# Patient Record
Sex: MALE | Race: WHITE | NOT HISPANIC OR LATINO | URBAN - METROPOLITAN AREA
[De-identification: names, ages, dates, MRNs, and addresses within clinical notes are randomized per-mention and may not be internally consistent; named-entity substitution may affect disease eponyms.]

---

## 2017-05-30 DIAGNOSIS — Z00.00 WELL ADULT EXAM: ICD-10-CM

## 2017-05-31 ENCOUNTER — HOSPITAL ENCOUNTER (OUTPATIENT)
Facility: MEDICAL CENTER | Age: 72
End: 2017-05-31
Attending: FAMILY MEDICINE
Payer: COMMERCIAL

## 2017-05-31 ENCOUNTER — APPOINTMENT (OUTPATIENT)
Dept: OTHER | Facility: MEDICAL CENTER | Age: 72
End: 2017-05-31

## 2017-05-31 DIAGNOSIS — Z00.00 WELL ADULT EXAM: ICD-10-CM

## 2017-05-31 LAB
25(OH)D3 SERPL-MCNC: 14 NG/ML (ref 30–100)
ALBUMIN SERPL BCP-MCNC: 4.6 G/DL (ref 3.2–4.9)
ALBUMIN/GLOB SERPL: 1.6 G/DL
ALP SERPL-CCNC: 76 U/L (ref 30–99)
ALT SERPL-CCNC: 14 U/L (ref 2–50)
ANION GAP SERPL CALC-SCNC: 6 MMOL/L (ref 0–11.9)
APPEARANCE UR: CLEAR
AST SERPL-CCNC: 20 U/L (ref 12–45)
BASOPHILS # BLD AUTO: 0.2 % (ref 0–1.8)
BASOPHILS # BLD: 0.02 K/UL (ref 0–0.12)
BILIRUB SERPL-MCNC: 1.8 MG/DL (ref 0.1–1.5)
BILIRUB UR QL STRIP.AUTO: NEGATIVE
BUN SERPL-MCNC: 19 MG/DL (ref 8–22)
CALCIUM SERPL-MCNC: 9.2 MG/DL (ref 8.5–10.5)
CHLORIDE SERPL-SCNC: 105 MMOL/L (ref 96–112)
CO2 SERPL-SCNC: 27 MMOL/L (ref 20–33)
COLOR UR: YELLOW
CREAT SERPL-MCNC: 1.34 MG/DL (ref 0.5–1.4)
CREAT UR-MCNC: 120.4 MG/DL
CRP SERPL HS-MCNC: 1.7 MG/L (ref 0–7.5)
EOSINOPHIL # BLD AUTO: 0.08 K/UL (ref 0–0.51)
EOSINOPHIL NFR BLD: 0.9 % (ref 0–6.9)
ERYTHROCYTE [DISTWIDTH] IN BLOOD BY AUTOMATED COUNT: 42.8 FL (ref 35.9–50)
EST. AVERAGE GLUCOSE BLD GHB EST-MCNC: 108 MG/DL
GFR SERPL CREATININE-BSD FRML MDRD: 53 ML/MIN/1.73 M 2
GLOBULIN SER CALC-MCNC: 2.9 G/DL (ref 1.9–3.5)
GLUCOSE SERPL-MCNC: 103 MG/DL (ref 65–99)
GLUCOSE UR STRIP.AUTO-MCNC: NEGATIVE MG/DL
HBA1C MFR BLD: 5.4 % (ref 0–5.6)
HCT VFR BLD AUTO: 49 % (ref 42–52)
HGB BLD-MCNC: 16.5 G/DL (ref 14–18)
IMM GRANULOCYTES # BLD AUTO: 0.03 K/UL (ref 0–0.11)
IMM GRANULOCYTES NFR BLD AUTO: 0.4 % (ref 0–0.9)
KETONES UR STRIP.AUTO-MCNC: NEGATIVE MG/DL
LEUKOCYTE ESTERASE UR QL STRIP.AUTO: NEGATIVE
LYMPHOCYTES # BLD AUTO: 1.33 K/UL (ref 1–4.8)
LYMPHOCYTES NFR BLD: 15.6 % (ref 22–41)
MCH RBC QN AUTO: 31.1 PG (ref 27–33)
MCHC RBC AUTO-ENTMCNC: 33.7 G/DL (ref 33.7–35.3)
MCV RBC AUTO: 92.3 FL (ref 81.4–97.8)
MICRO URNS: NORMAL
MICROALBUMIN UR-MCNC: 2 MG/DL
MICROALBUMIN/CREAT UR: 17 MG/G (ref 0–30)
MONOCYTES # BLD AUTO: 0.44 K/UL (ref 0–0.85)
MONOCYTES NFR BLD AUTO: 5.2 % (ref 0–13.4)
NEUTROPHILS # BLD AUTO: 6.63 K/UL (ref 1.82–7.42)
NEUTROPHILS NFR BLD: 77.7 % (ref 44–72)
NITRITE UR QL STRIP.AUTO: NEGATIVE
NRBC # BLD AUTO: 0 K/UL
NRBC BLD AUTO-RTO: 0 /100 WBC
PH UR STRIP.AUTO: 6.5 [PH]
PLATELET # BLD AUTO: 186 K/UL (ref 164–446)
PMV BLD AUTO: 10.5 FL (ref 9–12.9)
POTASSIUM SERPL-SCNC: 4.5 MMOL/L (ref 3.6–5.5)
PROT SERPL-MCNC: 7.5 G/DL (ref 6–8.2)
PROT UR QL STRIP: NEGATIVE MG/DL
PSA SERPL-MCNC: 2.53 NG/ML (ref 0–4)
RBC # BLD AUTO: 5.31 M/UL (ref 4.7–6.1)
RBC UR QL AUTO: NEGATIVE
SODIUM SERPL-SCNC: 138 MMOL/L (ref 135–145)
SP GR UR STRIP.AUTO: 1.01
T4 FREE SERPL-MCNC: 0.97 NG/DL (ref 0.53–1.43)
TSH SERPL DL<=0.005 MIU/L-ACNC: 0.77 UIU/ML (ref 0.3–3.7)
WBC # BLD AUTO: 8.5 K/UL (ref 4.8–10.8)

## 2017-06-02 LAB
LPA SERPL-MCNC: 8 MG/DL
SHBG SERPL-SCNC: 57 NMOL/L (ref 11–80)
TESTOST FREE MFR SERPL: 1.4 % (ref 1.6–2.9)
TESTOST FREE SERPL-MCNC: 79 PG/ML (ref 47–244)
TESTOST SERPL-MCNC: 577 NG/DL (ref 300–720)

## 2017-06-04 LAB
CHOLEST SERPL-MCNC: 185 MG/DL (ref 100–199)
HDL PARTICAL NO Q4363: 32.3 UMOL/L
HDL SERPL QN: 10.1 NM
HDLC SERPL-MCNC: 75 MG/DL
HLD.LARGE SERPL-SCNC: 12.3 UMOL/L
LDL MED SERPL QN: 21.5 NM
LDL SERPL QN: 21.5 NM
LDL SERPL-SCNC: 958 NMOL/L
LDL SMALL SERPL-SCNC: <90 NMOL/L
LDL SMALL SERPL-SCNC: <90 NMOL/L
LDLC SERPL CALC-MCNC: 92 MG/DL (ref 0–99)
LP IR SCORE Q4364: <25
TRIGL SERPL-MCNC: 89 MG/DL (ref 0–149)
VLDL LARGE SERPL-SCNC: 2 NMOL/L
VLDL SERPL QN: 45.6 NM

## 2017-06-06 ENCOUNTER — HOSPITAL ENCOUNTER (OUTPATIENT)
Facility: MEDICAL CENTER | Age: 72
End: 2017-06-06
Attending: FAMILY MEDICINE
Payer: COMMERCIAL

## 2017-06-06 PROCEDURE — 82274 ASSAY TEST FOR BLOOD FECAL: CPT

## 2017-06-09 ENCOUNTER — HOSPITAL ENCOUNTER (OUTPATIENT)
Facility: MEDICAL CENTER | Age: 72
End: 2017-06-09
Attending: FAMILY MEDICINE

## 2017-06-09 LAB — HEMOCCULT STL QL IA: NEGATIVE

## 2017-06-15 ENCOUNTER — HOSPITAL ENCOUNTER (OUTPATIENT)
Dept: RADIOLOGY | Facility: MEDICAL CENTER | Age: 72
End: 2017-06-15
Attending: FAMILY MEDICINE
Payer: COMMERCIAL

## 2017-06-15 ENCOUNTER — OFFICE VISIT (OUTPATIENT)
Dept: OTHER | Facility: MEDICAL CENTER | Age: 72
End: 2017-06-15

## 2017-06-15 VITALS
DIASTOLIC BLOOD PRESSURE: 90 MMHG | OXYGEN SATURATION: 96 % | BODY MASS INDEX: 27.04 KG/M2 | HEIGHT: 73 IN | WEIGHT: 204 LBS | HEART RATE: 72 BPM | TEMPERATURE: 97.5 F | RESPIRATION RATE: 14 BRPM | SYSTOLIC BLOOD PRESSURE: 140 MMHG

## 2017-06-15 DIAGNOSIS — Z28.39 BEHIND ON IMMUNIZATIONS: ICD-10-CM

## 2017-06-15 DIAGNOSIS — Z82.49 FAMILY HISTORY OF ISCHEMIC HEART DISEASE (IHD): ICD-10-CM

## 2017-06-15 DIAGNOSIS — K80.20 CALCULUS OF GALLBLADDER WITHOUT CHOLECYSTITIS WITHOUT OBSTRUCTION: ICD-10-CM

## 2017-06-15 DIAGNOSIS — I65.23 CAROTID ATHEROSCLEROSIS, BILATERAL: ICD-10-CM

## 2017-06-15 DIAGNOSIS — Z00.00 PHYSICAL EXAM, ROUTINE: ICD-10-CM

## 2017-06-15 DIAGNOSIS — I25.84 CORONARY ATHEROSCLEROSIS DUE TO CALCIFIED CORONARY LESION OF NATIVE ARTERY: ICD-10-CM

## 2017-06-15 DIAGNOSIS — E04.9 GOITER: ICD-10-CM

## 2017-06-15 DIAGNOSIS — R17 TOTAL BILIRUBIN, ELEVATED: ICD-10-CM

## 2017-06-15 DIAGNOSIS — N40.1 BENIGN NON-NODULAR PROSTATIC HYPERPLASIA WITH LOWER URINARY TRACT SYMPTOMS: ICD-10-CM

## 2017-06-15 DIAGNOSIS — I48.20 CHRONIC ATRIAL FIBRILLATION (HCC): ICD-10-CM

## 2017-06-15 DIAGNOSIS — I70.0 THORACIC AORTA ATHEROSCLEROSIS (HCC): ICD-10-CM

## 2017-06-15 DIAGNOSIS — Z80.0 FAMILY HISTORY OF PANCREATIC CANCER: ICD-10-CM

## 2017-06-15 DIAGNOSIS — E88.819 INSULIN RESISTANCE: ICD-10-CM

## 2017-06-15 DIAGNOSIS — E55.9 VITAMIN D DEFICIENCY: ICD-10-CM

## 2017-06-15 DIAGNOSIS — R73.01 ELEVATED FASTING GLUCOSE: ICD-10-CM

## 2017-06-15 DIAGNOSIS — Z82.49 FAMILY HISTORY OF HYPERTENSION IN MOTHER: ICD-10-CM

## 2017-06-15 DIAGNOSIS — R93.1 AGATSTON CAC SCORE, >400: ICD-10-CM

## 2017-06-15 DIAGNOSIS — Z00.00 WELL ADULT EXAM: Primary | ICD-10-CM

## 2017-06-15 DIAGNOSIS — I10 ELEVATED BLOOD PRESSURE READING IN OFFICE WITH DIAGNOSIS OF HYPERTENSION: ICD-10-CM

## 2017-06-15 DIAGNOSIS — I25.10 CORONARY ATHEROSCLEROSIS DUE TO CALCIFIED CORONARY LESION OF NATIVE ARTERY: ICD-10-CM

## 2017-06-15 PROCEDURE — 306734 HCHG ADVANCED VASCULAR SCREENING

## 2017-06-15 PROCEDURE — 76700 US EXAM ABDOM COMPLETE: CPT

## 2017-06-15 PROCEDURE — 4410556 CT-CARDIAC SCORING

## 2017-06-15 PROCEDURE — 71020 DX-CHEST-2 VIEWS: CPT

## 2017-06-15 NOTE — PROGRESS NOTES
[facility]    EXECUTIVE HISTORY AND PHYSICAL  Performed by Dr. Shine Burch    SUBJECTIVE:    Chief Complaint   Patient presents with   • Executive Physical     New patient to [facility]   • Atrial Fibrillation     Chronic, untreated   • Other     He does not currently have a PCP   • Hyperglycemia   • Abnormal Labs     Elevated Total bilirubin   • Vitamin D Deficiency   • Coronary Artery Disease     Per signficantly elevated CT-Cardiac Score   • Other     Carotid and Aortic atherosclerosis   • Cholelithiasis     Asymptomatic   • Benign Prostatic Hypertrophy   • Other     Family hx of IHD, HTN, Panreatic Cancer       Sharan Noe is a 72 y.o. male,   New Patient @ [facility]    Preventive medicine issues discussed:  abuse, aspirin, dental, Alcohol, Tobacco, HIV/AIDS, injuries, mental health/depression, nutrition, exercise, occupational health, sexual behavior, UV exposure, Cancer Screening. Vaccines.      PROBLEM #1-HISTORY OF PRESENT ILLNESS  Existing Problem  PATIENT STATEMENT OF PROBLEM - Chronic, untreated A.fib  ONSET - 30+ years  COURSE - A.fib again seen on ECG. Exercise Stress Test otherwise normal/negative.  Patient was on Warfarin decades ago, but he discontinued on his own after a short period and has never resumed anticoagulation therapy.  Denies Cerebrovascular Disease history or symptoms.   INTENSITY/STATUS/LOCATION/RADIATION - present  AGGRAVATORS - ?  RELIEVERS - none  TREATMENTS/COMPLIANCE/@GOAL? - none/ no/ no     PROBLEM #2-HISTORY OF PRESENT ILLNESS  New Problem  PATIENT STATEMENT OF PROBLEM - Subclinical significant Atherosclerosis  ONSET - identified today  COURSE - No CVD/CeVD event history.  Family Hx of Ischemic Heart Disease and HTN.  CT-Cardiac Score today is 1974.3.  He is also found to have Carotid atherosclerosis and Thoracic Aorta atherosclerosis per Imaging. Current pertinent labs:   HIGH & INTERMEDIATE RISK:  FBS(103)/Microalbumin Creatinine Ratio/D(14).    His NMR lipoprotein profile is normal. Total Bilirubin elevated at 1.8.  INTENSITY/STATUS/LOCATION/RADIATION - severe/ subclinical/ as above  AGGRAVATORS - Multifactorial. Blood pressure is elevated today. He is found to have Cholelithiasis.   RELIEVERS - some Therapeutic Lifestyle Changes   TREATMENTS/COMPLIANCE/@GOAL? - same/ no/ no     PROBLEM #3-HISTORY OF PRESENT ILLNESS  New Problem  PATIENT STATEMENT OF PROBLEM - Asymptomatic Cholelithiasis  ONSET - identified today via U/S  COURSE - Asymptomatic. Counseled.     PROBLEM #4-HISTORY OF PRESENT ILLNESS  Likely Existing Problem  PATIENT STATEMENT OF PROBLEM - BPH per U/S and LORE  ONSET - discussed today  COURSE - counseled. PSA normal for age.     PROBLEM #5-HISTORY OF PRESENT ILLNESS  Existing Problem  PATIENT STATEMENT OF PROBLEM - Family history of Pancreatic Cancer  ONSET - years  COURSE - per U/S: The visualized pancreas is unremarkable    PROBLEM #6-HISTORY OF PRESENT ILLNESS  New Problem  PATIENT STATEMENT OF PROBLEM - He is overdue for Colonoscopy and Prevnar.   ONSET - discussed today    Diagnosing METABOLIC SYNDROME  -?-Must have 3 or more of the following 5 Risk Factors(Patient meets criteria # 4,5)     RISK FACTOR    DEFINING LEVEL  1-Abdominal Obesity        Waist [email or web address]   Men (North Americans)  >102 cm (>40 inches)   Women (North Americans)  >88 cm (>35 inches)  (see literature for Ethnic Group waist circumference differences)  2-Triglycerides ?150 mg/dL (or on treatment for this lipid disorder)  3-HDL Cholesterol    Men  <40 mg/dL (or on treatment for this lipid disorder)   Women <50 mg/dL (or on treatment for this lipid disorder)  4-Blood Pressure  ?130 systolic or ?84 diastolic (or on HTN treatment)  5-Fasting Glucose ?100 mg/dL(or previously diagnosed DM or Ins. Resistance)  (FYI: If FBS ?100 mg/dL, then patient also has Insulin  Resistance)    Synonyms  Hypertension-hyperglycemia-hyperuricemia syndrome   Syndrome X   Dysmetabolic syndrome X   Insulin resistance syndrome   Metabolic dyslipidemia   The deadly quartet (upper-body obesity, glucose intolerance, hypertriglyceridemia, and hypertension)   Civilization syndrome  Reaven Syndrome    Diagnosing INSULIN RESISTANCE: Any 1 of following (Patient meets criteria # 3)  1. Presence of METABOLIC SYNDROME  2. TRIGLYCERIDE/HDL RATIO:  - >3.5 = IR in Caucasians  - ?3.0 = IR in /E[place]re[place] Americans  - ?2.0 = IR in Non- Blacks  3. Fasting Blood Sugar ? 100 mg/dL         (If FBS > 126, then DM2)  4. Oral Glucose Tolerance Test   - One hour glucose: ? 125 mg/dL   - (If > 150, significantly increased risk of developing DM2)  - Two hour glucose: ? 120 mg/dL  - (120-139=only 33% B-cell fx. 140-199=only 15% B-cell fx)   - (200 or above=DM2 and only 10% B-cell fx.)  - PRE-DIABETES, a type of Insulin Resistance:  o Two hour glucose of 140 to 199  5. A1c ? 6.5%                     (or ? 5.7 % AND the following 2 Dental Parameters:    1- ? 26% of Gum Pockets are ?5mm depth    2- ? 4 Missing Teeth)     No Known Allergies    Patient Active Problem List    Diagnosis Date Noted   • Atrial fibrillation (CMS-HCC) 12/30/2011     Priority: High   • Executive History and Physical examination 06/22/2017   • Chronic atrial fibrillation (CMS-HCC) 06/22/2017   • Elevated blood pressure reading in office with diagnosis of hypertension 06/22/2017   • Agatston CAC score, >400 06/22/2017   • Elevated fasting glucose 06/22/2017   • Insulin resistance 06/22/2017   • Total bilirubin, elevated 06/22/2017   • Vitamin D deficiency 06/22/2017   • Coronary atherosclerosis due to calcified coronary lesions of native arteries, severe, asymptomatic 06/22/2017   • Carotid atherosclerosis 06/22/2017   • Thoracic aorta atherosclerosis (CMS-HCC) 06/22/2017   • Calculus of gallbladder without cholecystitis without obstruction  "06/22/2017   • Benign non-nodular prostatic hyperplasia with lower urinary tract symptoms 06/22/2017   • Behind on immunizations & screening Colonoscopy 06/22/2017   • A-fib (CMS-[facility])    • Goiter    • Family history of ischemic heart disease (IHD)    • Family history of hypertension in mother    • Family history of pancreatic cancer        Current Outpatient Prescriptions on File Prior to Visit   Medication Sig Dispense Refill   • vardenafil (LEVITRA) 10 MG tablet Take 10 mg by mouth as needed.       No current facility-administered medications on file prior to visit.       Social History     Social History   • Marital Status:      Spouse Name: Cynthia   • Number of Children: 2   • Years of Education: 20     Occupational History   •       Social History Main Topics   • Smoking status: Never Smoker    • Smokeless tobacco: Never Used   • Alcohol Use: 0.0 oz/week     0 Standard drinks or equivalent per week      Comment: rarely   • Drug Use: No   • Sexual Activity:     Partners: Female      Comment: has used levitra in past     Other Topics Concern   • Not on file     Social History Narrative       Family History   Problem Relation Age of Onset   • Hypertension Mother    • Heart Attack Father 89   • Dementia Mother 80   • Cancer Sister 68     pancreatic   • Other Sister      Brain aneurysm       Patient's Past, Social, and Family History reviewed     REVIEW OF SYMPTOMS:               Pertinent Positives as above.    All other systems reviewed and negative.     OBJECTIVE:    /90 mmHg  Pulse 72  Temp(Src) 36.4 °C (97.5 °F)  Resp 14  Ht 1.842 m (6' 0.5\")  Wt 92.534 kg (204 lb)  BMI 27.27 kg/m2  SpO2 96%  Body mass index is 27.27 kg/(m^2).    Well developed, well nourished male, no acute distress, non-ill appearing. Comfortable, appears stated age, pleasant and cooperative, Alert and Oriented x 3.   HEAD: atraumatic, normocephalic   EYES: Conjunctiva normal, EOMI, PERRLA, acuity grossly intact. "   EARS/NOSE/THROAT: TM's normal, no SSX of infection, no perforation, no hemotympanum, acuity grossly intact. Oropharynx: benign, no lesions noted. Nares: benign.   NECK: supple, no adenopathy, mild thyromegaly without nodules, no JVD, no carotid bruits.   CHEST/LUNGS: clear to auscultation and percussion bilaterally. No adventitious breath sounds.   CARDIOVASCULAR: regular rate and rhythm, no murmur. PMI not displaced. Good central and peripheral pulses.   BACK: no CVA tenderness.   ABDOMEN: soft, non-tender, non-distended, no masses, no hepatosplenomegaly. Normal active bowel tones.   : deferred.   Rectal: prostate moderately enlarged, no palpable other abnormalities.    Extremities: warm/well-perfused, no cyanosis, clubbing, or edema.   SKIN: clear, unbroken, no rashes, normal turgor.   Neuro: Mental Status: Alert and Oriented x 3. CN II-XII grossly intact. Gait normal. Non-focal, intact. Normal strength, sensation    EXERCISE STRESS TEST REPORT:    Interpreted by me    INTERPRETATION:  Atrial fibrillation on ECG. Patient achieved 100% of maximum predicted heart rate with physiological response in blood pressure and no associated ST segment depression.   Also, specifically no associated ST elevation, no significant ventricular extrasystoles, no ventricular tachycardia,or supraventricular tachycardia, and no new heart blocks.  Patient denied chest pain, severe dyspnea, dizziness, or ataxia.     CONCLUSION:  Atrial Fibrillation. Negative Exercise Stress Test indicating low probability of flow-limiting coronary artery disease. Poor exercise tolerance.     ASSESSMENT:    Encounter Diagnoses   Name Primary?   • Executive History and Physical examination Yes   • Chronic atrial fibrillation (CMS-HCC)    • Elevated blood pressure reading in office with diagnosis of hypertension    • Family history of ischemic heart disease (IHD)    • Family history of hypertension in mother    • Family history of pancreatic cancer    •  Agatston CAC score, >400    • Elevated fasting glucose    • Insulin resistance    • Total bilirubin, elevated    • Vitamin D deficiency    • Coronary atherosclerosis due to calcified coronary lesions of native arteries, severe, asymptomatic    • Carotid atherosclerosis, bilateral    • Thoracic aorta atherosclerosis (CMS-HCC)    • Calculus of gallbladder without cholecystitis without obstruction    • Benign non-nodular prostatic hyperplasia with lower urinary tract symptoms    • Behind on immunizations & screening Colonoscopy    • Goiter        PLAN:    Total Face-to-Face time spent with patient: 75 minutes  Amount of time spent counseling patient and/or coordinating care: 50 minutes    The nature of patient counseling as below:  -Patient Education  -Differential Diagnoses and treatment options discussed  -Risks, benefits, alternatives discussed  -Labs reviewed with patient in detail  -ETT/ECG/Imaging/PFT/vision/hearing reports reviewed with patient in detail  -Health Maintenance Exam issues discussed  -Exercise  -Dietary recommendations discussed  -Weight Loss strategies discussed  -Therapeutic Lifestyle Changes discussed    The nature of coordination of care as below:  -Medications added: Final decisions regarding medications to be made between patient and Primary Care Provider, and Cardiology.  Review of the chart reveals that previous providers have recommended anticoagulation therapy for his A.fib.  Patient was on Warfarin for a short time several decades ago, but discontinued this on his own and has never resumed.  I have referred him to Cardiology to address overall treatment for chronic untreated A.fib, and also newly diagnosed severe, subclinical atherosclerosis, primarily of the coronary arteries.  He realizes that it will likely again be recommended that he begin anticoagulation therapy, along with statin.  Blood pressure diary will help dictate decisions regarding anti-hypertension treatment.    -Medications discontinued: none  -Medications adjusted: none  -Referrals: Cardiology referral process initiated   -I strongly encouraged him to establish with and schedule appointment with Primary Care Provider ASAP to review results of today's examination and develop a plan moving forward.  -Other: He now knows he is overdue for repeat COLONOSCOPY   -He is due for Prevnar vaccine  -Seek medical attention immediately if worse    FOLLOW-UP:  -With Primary Care Provider soon, and with Cardiology soon (referral initiated, and patient has CARD phone number to call).

## 2017-06-15 NOTE — Clinical Note
"June 22, 2017        Sharan Noe  [address] [place] [address]        Dear Sharan Mccray:    Thank you for participating in [facility].  I enjoyed meeting you today and performing your Executive History and Physical examination.  We covered a great deal of information, and I have summarized my Assessment and Plan below.  Please carefully review all the information in this packet.  I do suggest you schedule an appointment with a Primary Care Provider soon to review the results of your examination and develop a plan moving forward.  As discussed, I also referred you to Dr. Niko Sawant, a local Cardiologist.  If you have not already done so, please schedule an appointment with Dr. Sawant.     We identified several issues today that warrant further attention.  You remain in Atrial Fibrillation, and it sounds like you have been aware of this diagnosis for 30+ years.  I do recommend you establish care with a local Cardiologist to make sure you are optimally treated for this condition, and to reduce your likelihood of stroke as a result of A.fib.  Your Blood Pressure was elevated today; please keep a blood pressure diary and present this diary (and your home BP machine) to your future healthcare providers.  You were found to have a significant amount of atherosclerotic plaque of your heart arteries, and a lesser amount of plaque in your neck and thoracic aorta arteries.  There was some evidence of arterial inflammation per labs.  This is another issue that needs to be addressed by your personal healthcare providers.  Please see my comments below regarding Statin Therapy, anticoagulation therapy, and anti-hypertension therapy.  As we discussed, you do have multiple Gallstones, and these may require future attention, particularly if they become symptomatic.  We discussed that you had tried \"Flomax\" for your enlarged prostate in the past.  If your Prostate symptoms worsen, you may be a " "candidate for a future trial of medications.  I do recommend you undergo another Screening Colonoscopy soon, and that you obtain a Prevnar vaccine soon.  Therapeutic Lifestyle Changes are very important for you, and I specifically recommend eating \"real food, mostly plants, not too much,\" a moderate amount of weight loss, daily exercise, actively managing stress, 7-8 hours of sleep per night, a loving social environment, and an altruistic philosophy.  If you have any questions or concerns, please don't hesitate to call.  Sincerely,        Shine Burch M.D.    ASSESSMENT:    Encounter Diagnoses   Name Primary?   • Executive History and Physical examination Yes   • Chronic atrial fibrillation (CMS-HCC)    • Elevated blood pressure reading in office with diagnosis of hypertension    • Family history of ischemic heart disease (IHD)    • Family history of hypertension in mother    • Family history of pancreatic cancer    • Agatston CAC score, >400    • Elevated fasting glucose    • Insulin resistance    • Total bilirubin, elevated    • Vitamin D deficiency    • Coronary atherosclerosis due to calcified coronary lesions of native arteries, severe, asymptomatic    • Carotid atherosclerosis, bilateral    • Thoracic aorta atherosclerosis (CMS-HCC)    • Calculus of gallbladder without cholecystitis without obstruction    • Benign non-nodular prostatic hyperplasia with lower urinary tract symptoms    • Behind on immunizations & screening Colonoscopy    • Goiter        PLAN:    Total Face-to-Face time spent with patient: 75 minutes  Amount of time spent counseling patient and/or coordinating care: 50 minutes    The nature of patient counseling as below:  -Patient Education  -Differential Diagnoses and treatment options discussed  -Risks, benefits, alternatives discussed  -Labs reviewed with patient in detail  -ETT/ECG/Imaging/PFT/vision/hearing reports reviewed with patient in detail  -Health Maintenance Exam issues " discussed  -Exercise  -Dietary recommendations discussed  -Weight Loss strategies discussed  -Therapeutic Lifestyle Changes discussed    The nature of coordination of care as below:  -Medications added: Final decisions regarding medications to be made between patient and Primary Care Provider, and Cardiology.  Review of the chart reveals that previous providers have recommended anticoagulation therapy for his A.fib.  Patient was on Warfarin for a short time several decades ago, but discontinued this on his own and has never resumed.  I have referred him to Cardiology to address overall treatment for chronic untreated A.fib, and also newly diagnosed severe, subclinical atherosclerosis, primarily of the coronary arteries.  He realizes that it will likely again be recommended that he begin anticoagulation therapy, along with statin.  Blood pressure diary will help dictate decisions regarding anti-hypertension treatment.   -Medications discontinued: none  -Medications adjusted: none  -Referrals: Cardiology referral process initiated   -I strongly encouraged him to establish with and schedule appointment with Primary Care Provider ASAP to review results of today's examination and develop a plan moving forward.  -Other: He now knows he is overdue for repeat COLONOSCOPY   -He is due for Prevnar vaccine  -Seek medical attention immediately if worse    FOLLOW-UP:  -With Primary Care Provider soon, and with Cardiology soon (referral initiated, and patient has CARD phone number to call).

## 2017-06-22 PROBLEM — N40.1 BENIGN NON-NODULAR PROSTATIC HYPERPLASIA WITH LOWER URINARY TRACT SYMPTOMS: Status: ACTIVE | Noted: 2017-06-22

## 2017-06-22 PROBLEM — R17 TOTAL BILIRUBIN, ELEVATED: Status: ACTIVE | Noted: 2017-06-22

## 2017-06-22 PROBLEM — K80.20 CALCULUS OF GALLBLADDER WITHOUT CHOLECYSTITIS WITHOUT OBSTRUCTION: Status: ACTIVE | Noted: 2017-06-22

## 2017-06-22 PROBLEM — E55.9 VITAMIN D DEFICIENCY: Status: ACTIVE | Noted: 2017-06-22

## 2017-06-22 PROBLEM — E88.819 INSULIN RESISTANCE: Status: ACTIVE | Noted: 2017-06-22

## 2017-06-22 PROBLEM — I65.29 CAROTID ATHEROSCLEROSIS: Status: ACTIVE | Noted: 2017-06-22

## 2017-06-22 PROBLEM — Z00.00 WELL ADULT EXAM: Status: ACTIVE | Noted: 2017-06-22

## 2017-06-22 PROBLEM — I10 ELEVATED BLOOD PRESSURE READING IN OFFICE WITH DIAGNOSIS OF HYPERTENSION: Status: ACTIVE | Noted: 2017-06-22

## 2017-06-22 PROBLEM — I25.10 CORONARY ATHEROSCLEROSIS DUE TO CALCIFIED CORONARY LESION OF NATIVE ARTERY: Status: ACTIVE | Noted: 2017-06-22

## 2017-06-22 PROBLEM — I48.20 CHRONIC ATRIAL FIBRILLATION (HCC): Status: ACTIVE | Noted: 2017-06-22

## 2017-06-22 PROBLEM — R73.01 ELEVATED FASTING GLUCOSE: Status: ACTIVE | Noted: 2017-06-22

## 2017-06-22 PROBLEM — I25.84 CORONARY ATHEROSCLEROSIS DUE TO CALCIFIED CORONARY LESION OF NATIVE ARTERY: Status: ACTIVE | Noted: 2017-06-22

## 2017-06-22 PROBLEM — I70.0 THORACIC AORTA ATHEROSCLEROSIS (HCC): Status: ACTIVE | Noted: 2017-06-22

## 2017-06-22 PROBLEM — Z28.39 BEHIND ON IMMUNIZATIONS: Status: ACTIVE | Noted: 2017-06-22

## 2017-06-22 PROBLEM — R93.1 AGATSTON CAC SCORE, >400: Status: ACTIVE | Noted: 2017-06-22

## 2017-06-22 NOTE — PATIENT INSTRUCTIONS
Current Outpatient Prescriptions Ordered in [facility]   Medication Sig Dispense Refill   • vardenafil (LEVITRA) 10 MG tablet Take 10 mg by mouth as needed.       No current Epic-ordered facility-administered medications on file.

## 2017-11-15 ENCOUNTER — APPOINTMENT (OUTPATIENT)
Dept: SOCIAL WORK | Facility: CLINIC | Age: 72
End: 2017-11-15

## 2017-11-15 PROCEDURE — 90662 IIV NO PRSV INCREASED AG IM: CPT | Performed by: REGISTERED NURSE

## 2019-03-22 DIAGNOSIS — Z01.810 PRE-OPERATIVE CARDIOVASCULAR EXAMINATION: ICD-10-CM

## 2019-03-22 DIAGNOSIS — Z01.812 PRE-OPERATIVE LABORATORY EXAMINATION: ICD-10-CM

## 2019-03-22 LAB
ERYTHROCYTE [DISTWIDTH] IN BLOOD BY AUTOMATED COUNT: 43.8 FL (ref 35.9–50)
HCT VFR BLD AUTO: 44.2 % (ref 42–52)
HGB BLD-MCNC: 15 G/DL (ref 14–18)
MCH RBC QN AUTO: 31.7 PG (ref 27–33)
MCHC RBC AUTO-ENTMCNC: 33.9 G/DL (ref 33.7–35.3)
MCV RBC AUTO: 93.4 FL (ref 81.4–97.8)
PLATELET # BLD AUTO: 177 K/UL (ref 164–446)
PMV BLD AUTO: 9.9 FL (ref 9–12.9)
RBC # BLD AUTO: 4.73 M/UL (ref 4.7–6.1)
WBC # BLD AUTO: 6.8 K/UL (ref 4.8–10.8)

## 2019-03-22 PROCEDURE — 36415 COLL VENOUS BLD VENIPUNCTURE: CPT

## 2019-03-22 PROCEDURE — 93005 ELECTROCARDIOGRAM TRACING: CPT

## 2019-03-22 PROCEDURE — 87086 URINE CULTURE/COLONY COUNT: CPT

## 2019-03-22 PROCEDURE — 81001 URINALYSIS AUTO W/SCOPE: CPT

## 2019-03-22 PROCEDURE — 85027 COMPLETE CBC AUTOMATED: CPT

## 2019-03-22 RX ORDER — ATORVASTATIN CALCIUM 10 MG/1
10 TABLET, FILM COATED ORAL DAILY
COMMUNITY
End: 2021-10-27 | Stop reason: SDUPTHER

## 2019-03-22 RX ORDER — CARVEDILOL 3.12 MG/1
3.12 TABLET ORAL 2 TIMES DAILY WITH MEALS
COMMUNITY

## 2019-03-23 LAB
APPEARANCE UR: CLEAR
BACTERIA #/AREA URNS HPF: NEGATIVE /HPF
BILIRUB UR QL STRIP.AUTO: NEGATIVE
COLOR UR: YELLOW
EKG IMPRESSION: NORMAL
EPI CELLS #/AREA URNS HPF: NEGATIVE /HPF
GLUCOSE UR STRIP.AUTO-MCNC: NEGATIVE MG/DL
HYALINE CASTS #/AREA URNS LPF: ABNORMAL /LPF
KETONES UR STRIP.AUTO-MCNC: NEGATIVE MG/DL
LEUKOCYTE ESTERASE UR QL STRIP.AUTO: NEGATIVE
MICRO URNS: ABNORMAL
NITRITE UR QL STRIP.AUTO: NEGATIVE
PH UR STRIP.AUTO: 5.5 [PH]
PROT UR QL STRIP: 30 MG/DL
RBC # URNS HPF: ABNORMAL /HPF
RBC UR QL AUTO: NEGATIVE
SP GR UR STRIP.AUTO: 1.02
UROBILINOGEN UR STRIP.AUTO-MCNC: 0.2 MG/DL
WBC #/AREA URNS HPF: ABNORMAL /HPF

## 2019-03-23 PROCEDURE — 93010 ELECTROCARDIOGRAM REPORT: CPT | Performed by: INTERNAL MEDICINE

## 2019-03-24 LAB
BACTERIA UR CULT: NORMAL
SIGNIFICANT IND 70042: NORMAL
SITE SITE: NORMAL
SOURCE SOURCE: NORMAL

## 2019-04-11 ENCOUNTER — ANESTHESIA EVENT (OUTPATIENT)
Dept: SURGERY | Facility: MEDICAL CENTER | Age: 74
End: 2019-04-11
Payer: MEDICARE

## 2019-04-11 ENCOUNTER — ANESTHESIA (OUTPATIENT)
Dept: SURGERY | Facility: MEDICAL CENTER | Age: 74
End: 2019-04-11
Payer: MEDICARE

## 2019-04-11 ENCOUNTER — HOSPITAL ENCOUNTER (OUTPATIENT)
Facility: MEDICAL CENTER | Age: 74
End: 2019-04-12
Attending: UROLOGY | Admitting: UROLOGY
Payer: MEDICARE

## 2019-04-11 LAB — PATHOLOGY CONSULT NOTE: NORMAL

## 2019-04-11 PROCEDURE — 160036 HCHG PACU - EA ADDL 30 MINS PHASE I: Performed by: UROLOGY

## 2019-04-11 PROCEDURE — 700101 HCHG RX REV CODE 250: Performed by: UROLOGY

## 2019-04-11 PROCEDURE — 700111 HCHG RX REV CODE 636 W/ 250 OVERRIDE (IP): Performed by: ANESTHESIOLOGY

## 2019-04-11 PROCEDURE — A4346 CATH INDW FOLEY 3 WAY: HCPCS | Performed by: UROLOGY

## 2019-04-11 PROCEDURE — 500879 HCHG PACK, CYSTO: Performed by: UROLOGY

## 2019-04-11 PROCEDURE — 88342 IMHCHEM/IMCYTCHM 1ST ANTB: CPT

## 2019-04-11 PROCEDURE — 501329 HCHG SET, CYSTO IRRIG Y TUR: Performed by: UROLOGY

## 2019-04-11 PROCEDURE — 160048 HCHG OR STATISTICAL LEVEL 1-5: Performed by: UROLOGY

## 2019-04-11 PROCEDURE — A4357 BEDSIDE DRAINAGE BAG: HCPCS | Performed by: UROLOGY

## 2019-04-11 PROCEDURE — 160035 HCHG PACU - 1ST 60 MINS PHASE I: Performed by: UROLOGY

## 2019-04-11 PROCEDURE — 160002 HCHG RECOVERY MINUTES (STAT): Performed by: UROLOGY

## 2019-04-11 PROCEDURE — 160028 HCHG SURGERY MINUTES - 1ST 30 MINS LEVEL 3: Performed by: UROLOGY

## 2019-04-11 PROCEDURE — 160009 HCHG ANES TIME/MIN: Performed by: UROLOGY

## 2019-04-11 PROCEDURE — 96374 THER/PROPH/DIAG INJ IV PUSH: CPT

## 2019-04-11 PROCEDURE — A9270 NON-COVERED ITEM OR SERVICE: HCPCS | Performed by: ANESTHESIOLOGY

## 2019-04-11 PROCEDURE — 88341 IMHCHEM/IMCYTCHM EA ADD ANTB: CPT

## 2019-04-11 PROCEDURE — 88305 TISSUE EXAM BY PATHOLOGIST: CPT

## 2019-04-11 PROCEDURE — A9270 NON-COVERED ITEM OR SERVICE: HCPCS | Performed by: UROLOGY

## 2019-04-11 PROCEDURE — 700102 HCHG RX REV CODE 250 W/ 637 OVERRIDE(OP): Performed by: ANESTHESIOLOGY

## 2019-04-11 PROCEDURE — 700101 HCHG RX REV CODE 250: Performed by: ANESTHESIOLOGY

## 2019-04-11 PROCEDURE — 700102 HCHG RX REV CODE 250 W/ 637 OVERRIDE(OP): Performed by: UROLOGY

## 2019-04-11 PROCEDURE — G0378 HOSPITAL OBSERVATION PER HR: HCPCS

## 2019-04-11 PROCEDURE — 160039 HCHG SURGERY MINUTES - EA ADDL 1 MIN LEVEL 3: Performed by: UROLOGY

## 2019-04-11 PROCEDURE — 700111 HCHG RX REV CODE 636 W/ 250 OVERRIDE (IP)

## 2019-04-11 RX ORDER — ONDANSETRON 2 MG/ML
4 INJECTION INTRAMUSCULAR; INTRAVENOUS
Status: DISCONTINUED | OUTPATIENT
Start: 2019-04-11 | End: 2019-04-11 | Stop reason: HOSPADM

## 2019-04-11 RX ORDER — OXYCODONE HYDROCHLORIDE 5 MG/1
2.5 TABLET ORAL
Status: DISCONTINUED | OUTPATIENT
Start: 2019-04-11 | End: 2019-04-12 | Stop reason: HOSPADM

## 2019-04-11 RX ORDER — MIDAZOLAM HYDROCHLORIDE 1 MG/ML
INJECTION INTRAMUSCULAR; INTRAVENOUS
Status: COMPLETED
Start: 2019-04-11 | End: 2019-04-11

## 2019-04-11 RX ORDER — OXYCODONE HCL 5 MG/5 ML
10 SOLUTION, ORAL ORAL
Status: COMPLETED | OUTPATIENT
Start: 2019-04-11 | End: 2019-04-11

## 2019-04-11 RX ORDER — ATROPA BELLADONNA AND OPIUM 16.2; 6 MG/1; MG/1
60 SUPPOSITORY RECTAL EVERY 8 HOURS PRN
Status: DISCONTINUED | OUTPATIENT
Start: 2019-04-11 | End: 2019-04-12 | Stop reason: HOSPADM

## 2019-04-11 RX ORDER — OXYCODONE HYDROCHLORIDE 5 MG/1
5 TABLET ORAL
Status: DISCONTINUED | OUTPATIENT
Start: 2019-04-11 | End: 2019-04-12 | Stop reason: HOSPADM

## 2019-04-11 RX ORDER — CARVEDILOL 6.25 MG/1
3.12 TABLET ORAL 2 TIMES DAILY WITH MEALS
Status: DISCONTINUED | OUTPATIENT
Start: 2019-04-11 | End: 2019-04-12 | Stop reason: HOSPADM

## 2019-04-11 RX ORDER — MEPERIDINE HYDROCHLORIDE 25 MG/ML
6.25 INJECTION INTRAMUSCULAR; INTRAVENOUS; SUBCUTANEOUS
Status: DISCONTINUED | OUTPATIENT
Start: 2019-04-11 | End: 2019-04-11 | Stop reason: HOSPADM

## 2019-04-11 RX ORDER — SCOLOPAMINE TRANSDERMAL SYSTEM 1 MG/1
1 PATCH, EXTENDED RELEASE TRANSDERMAL
Status: DISCONTINUED | OUTPATIENT
Start: 2019-04-11 | End: 2019-04-12 | Stop reason: HOSPADM

## 2019-04-11 RX ORDER — DEXTROSE MONOHYDRATE, SODIUM CHLORIDE, AND POTASSIUM CHLORIDE 50; 1.49; 4.5 G/1000ML; G/1000ML; G/1000ML
INJECTION, SOLUTION INTRAVENOUS EVERY 6 HOURS
Status: COMPLETED | OUTPATIENT
Start: 2019-04-11 | End: 2019-04-11

## 2019-04-11 RX ORDER — HALOPERIDOL 5 MG/ML
1 INJECTION INTRAMUSCULAR EVERY 6 HOURS PRN
Status: DISCONTINUED | OUTPATIENT
Start: 2019-04-11 | End: 2019-04-12 | Stop reason: HOSPADM

## 2019-04-11 RX ORDER — METOCLOPRAMIDE HYDROCHLORIDE 5 MG/ML
INJECTION INTRAMUSCULAR; INTRAVENOUS PRN
Status: DISCONTINUED | OUTPATIENT
Start: 2019-04-11 | End: 2019-04-11 | Stop reason: SURG

## 2019-04-11 RX ORDER — HYDROMORPHONE HYDROCHLORIDE 1 MG/ML
0.4 INJECTION, SOLUTION INTRAMUSCULAR; INTRAVENOUS; SUBCUTANEOUS
Status: DISCONTINUED | OUTPATIENT
Start: 2019-04-11 | End: 2019-04-11 | Stop reason: HOSPADM

## 2019-04-11 RX ORDER — HYDROMORPHONE HYDROCHLORIDE 1 MG/ML
0.2 INJECTION, SOLUTION INTRAMUSCULAR; INTRAVENOUS; SUBCUTANEOUS
Status: DISCONTINUED | OUTPATIENT
Start: 2019-04-11 | End: 2019-04-11 | Stop reason: HOSPADM

## 2019-04-11 RX ORDER — ACETAMINOPHEN 500 MG
1000 TABLET ORAL EVERY 6 HOURS
Status: DISCONTINUED | OUTPATIENT
Start: 2019-04-11 | End: 2019-04-12 | Stop reason: HOSPADM

## 2019-04-11 RX ORDER — DEXAMETHASONE SODIUM PHOSPHATE 4 MG/ML
4 INJECTION, SOLUTION INTRA-ARTICULAR; INTRALESIONAL; INTRAMUSCULAR; INTRAVENOUS; SOFT TISSUE
Status: DISCONTINUED | OUTPATIENT
Start: 2019-04-11 | End: 2019-04-12 | Stop reason: HOSPADM

## 2019-04-11 RX ORDER — ATROPA BELLADONNA AND OPIUM 16.2; 6 MG/1; MG/1
SUPPOSITORY RECTAL
Status: DISCONTINUED | OUTPATIENT
Start: 2019-04-11 | End: 2019-04-11 | Stop reason: HOSPADM

## 2019-04-11 RX ORDER — ONDANSETRON 2 MG/ML
4 INJECTION INTRAMUSCULAR; INTRAVENOUS EVERY 4 HOURS PRN
Status: DISCONTINUED | OUTPATIENT
Start: 2019-04-11 | End: 2019-04-12 | Stop reason: HOSPADM

## 2019-04-11 RX ORDER — SODIUM CHLORIDE, SODIUM LACTATE, POTASSIUM CHLORIDE, AND CALCIUM CHLORIDE .6; .31; .03; .02 G/100ML; G/100ML; G/100ML; G/100ML
500 INJECTION, SOLUTION INTRAVENOUS ONCE
Status: DISCONTINUED | OUTPATIENT
Start: 2019-04-11 | End: 2019-04-11 | Stop reason: HOSPADM

## 2019-04-11 RX ORDER — DIPHENHYDRAMINE HYDROCHLORIDE 50 MG/ML
12.5 INJECTION INTRAMUSCULAR; INTRAVENOUS
Status: DISCONTINUED | OUTPATIENT
Start: 2019-04-11 | End: 2019-04-11 | Stop reason: HOSPADM

## 2019-04-11 RX ORDER — DEXAMETHASONE SODIUM PHOSPHATE 4 MG/ML
INJECTION, SOLUTION INTRA-ARTICULAR; INTRALESIONAL; INTRAMUSCULAR; INTRAVENOUS; SOFT TISSUE PRN
Status: DISCONTINUED | OUTPATIENT
Start: 2019-04-11 | End: 2019-04-11 | Stop reason: SURG

## 2019-04-11 RX ORDER — SODIUM CHLORIDE, SODIUM LACTATE, POTASSIUM CHLORIDE, CALCIUM CHLORIDE 600; 310; 30; 20 MG/100ML; MG/100ML; MG/100ML; MG/100ML
INJECTION, SOLUTION INTRAVENOUS CONTINUOUS
Status: ACTIVE | OUTPATIENT
Start: 2019-04-11 | End: 2019-04-11

## 2019-04-11 RX ORDER — ONDANSETRON 2 MG/ML
INJECTION INTRAMUSCULAR; INTRAVENOUS PRN
Status: DISCONTINUED | OUTPATIENT
Start: 2019-04-11 | End: 2019-04-11 | Stop reason: SURG

## 2019-04-11 RX ORDER — CEFAZOLIN SODIUM 1 G/3ML
INJECTION, POWDER, FOR SOLUTION INTRAMUSCULAR; INTRAVENOUS PRN
Status: DISCONTINUED | OUTPATIENT
Start: 2019-04-11 | End: 2019-04-11 | Stop reason: SURG

## 2019-04-11 RX ORDER — DIPHENHYDRAMINE HYDROCHLORIDE 50 MG/ML
25 INJECTION INTRAMUSCULAR; INTRAVENOUS EVERY 6 HOURS PRN
Status: DISCONTINUED | OUTPATIENT
Start: 2019-04-11 | End: 2019-04-12 | Stop reason: HOSPADM

## 2019-04-11 RX ORDER — HYDRALAZINE HYDROCHLORIDE 20 MG/ML
5 INJECTION INTRAMUSCULAR; INTRAVENOUS
Status: DISCONTINUED | OUTPATIENT
Start: 2019-04-11 | End: 2019-04-11 | Stop reason: HOSPADM

## 2019-04-11 RX ORDER — SODIUM CHLORIDE, SODIUM LACTATE, POTASSIUM CHLORIDE, CALCIUM CHLORIDE 600; 310; 30; 20 MG/100ML; MG/100ML; MG/100ML; MG/100ML
INJECTION, SOLUTION INTRAVENOUS CONTINUOUS
Status: DISCONTINUED | OUTPATIENT
Start: 2019-04-11 | End: 2019-04-11 | Stop reason: HOSPADM

## 2019-04-11 RX ORDER — HYDROMORPHONE HYDROCHLORIDE 1 MG/ML
0.1 INJECTION, SOLUTION INTRAMUSCULAR; INTRAVENOUS; SUBCUTANEOUS
Status: DISCONTINUED | OUTPATIENT
Start: 2019-04-11 | End: 2019-04-11 | Stop reason: HOSPADM

## 2019-04-11 RX ORDER — OXYCODONE HCL 5 MG/5 ML
5 SOLUTION, ORAL ORAL
Status: COMPLETED | OUTPATIENT
Start: 2019-04-11 | End: 2019-04-11

## 2019-04-11 RX ADMIN — METOCLOPRAMIDE 10 MG: 5 INJECTION, SOLUTION INTRAMUSCULAR; INTRAVENOUS at 07:30

## 2019-04-11 RX ADMIN — MIDAZOLAM HYDROCHLORIDE 2 MG: 1 INJECTION, SOLUTION INTRAMUSCULAR; INTRAVENOUS at 07:30

## 2019-04-11 RX ADMIN — ACETAMINOPHEN 1000 MG: 500 TABLET ORAL at 13:48

## 2019-04-11 RX ADMIN — ACETAMINOPHEN 1000 MG: 500 TABLET ORAL at 23:30

## 2019-04-11 RX ADMIN — ACETAMINOPHEN 1000 MG: 500 TABLET ORAL at 18:00

## 2019-04-11 RX ADMIN — CEFAZOLIN 2 G: 330 INJECTION, POWDER, FOR SOLUTION INTRAMUSCULAR; INTRAVENOUS at 07:30

## 2019-04-11 RX ADMIN — DEXAMETHASONE SODIUM PHOSPHATE 8 MG: 4 INJECTION, SOLUTION INTRA-ARTICULAR; INTRALESIONAL; INTRAMUSCULAR; INTRAVENOUS; SOFT TISSUE at 07:44

## 2019-04-11 RX ADMIN — LIDOCAINE HYDROCHLORIDE 100 MG: 20 INJECTION, SOLUTION INTRAVENOUS at 07:36

## 2019-04-11 RX ADMIN — POTASSIUM CHLORIDE, DEXTROSE MONOHYDRATE AND SODIUM CHLORIDE 1000 ML: 150; 5; 450 INJECTION, SOLUTION INTRAVENOUS at 11:44

## 2019-04-11 RX ADMIN — PROPOFOL 150 MG: 10 INJECTION, EMULSION INTRAVENOUS at 07:36

## 2019-04-11 RX ADMIN — SODIUM CHLORIDE, SODIUM LACTATE, POTASSIUM CHLORIDE, CALCIUM CHLORIDE: 600; 310; 30; 20 INJECTION, SOLUTION INTRAVENOUS at 07:13

## 2019-04-11 RX ADMIN — FENTANYL CITRATE 100 MCG: 50 INJECTION, SOLUTION INTRAMUSCULAR; INTRAVENOUS at 07:36

## 2019-04-11 RX ADMIN — FENTANYL CITRATE 50 MCG: 50 INJECTION, SOLUTION INTRAMUSCULAR; INTRAVENOUS at 09:32

## 2019-04-11 RX ADMIN — ONDANSETRON 4 MG: 2 INJECTION INTRAMUSCULAR; INTRAVENOUS at 07:48

## 2019-04-11 RX ADMIN — Medication 5 MG: at 11:17

## 2019-04-11 RX ADMIN — CARVEDILOL 3.12 MG: 6.25 TABLET, FILM COATED ORAL at 11:17

## 2019-04-11 RX ADMIN — CARVEDILOL 3.12 MG: 6.25 TABLET, FILM COATED ORAL at 17:16

## 2019-04-11 ASSESSMENT — COGNITIVE AND FUNCTIONAL STATUS - GENERAL
SUGGESTED CMS G CODE MODIFIER DAILY ACTIVITY: CH
DAILY ACTIVITIY SCORE: 24
MOBILITY SCORE: 24
SUGGESTED CMS G CODE MODIFIER MOBILITY: CH

## 2019-04-11 ASSESSMENT — LIFESTYLE VARIABLES
HAVE YOU EVER FELT YOU SHOULD CUT DOWN ON YOUR DRINKING: NO
ALCOHOL_USE: YES
TOTAL SCORE: 0
EVER_SMOKED: NEVER
HOW MANY TIMES IN THE PAST YEAR HAVE YOU HAD 5 OR MORE DRINKS IN A DAY: 0
EVER FELT BAD OR GUILTY ABOUT YOUR DRINKING: NO
ON A TYPICAL DAY WHEN YOU DRINK ALCOHOL HOW MANY DRINKS DO YOU HAVE: 1
TOTAL SCORE: 0
EVER HAD A DRINK FIRST THING IN THE MORNING TO STEADY YOUR NERVES TO GET RID OF A HANGOVER: NO
HAVE PEOPLE ANNOYED YOU BY CRITICIZING YOUR DRINKING: NO
TOTAL SCORE: 0
AVERAGE NUMBER OF DAYS PER WEEK YOU HAVE A DRINK CONTAINING ALCOHOL: 1
CONSUMPTION TOTAL: NEGATIVE

## 2019-04-11 ASSESSMENT — PATIENT HEALTH QUESTIONNAIRE - PHQ9
2. FEELING DOWN, DEPRESSED, IRRITABLE, OR HOPELESS: NOT AT ALL
SUM OF ALL RESPONSES TO PHQ9 QUESTIONS 1 AND 2: 0
1. LITTLE INTEREST OR PLEASURE IN DOING THINGS: NOT AT ALL

## 2019-04-11 ASSESSMENT — PAIN SCALES - GENERAL: PAIN_LEVEL: 1

## 2019-04-11 NOTE — OP REPORT
DATE OF SERVICE:  04/11/2019    NAME OF OPERATION:  Transurethral resection of prostate.    PREOPERATIVE DIAGNOSIS:  Benign prostatic hypertrophy with obstruction.    POSTOPERATIVE DIAGNOSIS:  Benign prostatic hypertrophy with obstruction.    PRIMARY SURGEON:  Francisco Valladares MD    ANESTHESIOLOGIST:  John Dacosta MD    FINDINGS:  Massive intravesical median lobe.    INDICATIONS:  Briefly, the patient is a 74-year-old gentleman with a long   history of lower urinary tract symptoms related to an obstructing prostate.    Upon consideration of his options, he elected to undergo surgical management   with TURP.  Informed consent was obtained.    OPERATION IN DETAIL:  Patient was taken to the operating room, placed on the   operating table in supine position.  After administration of general   anesthetic, he was placed in lithotomy.  Genitals were prepped and draped   sterilely.  Male sounds were used to gently dilate the urethral meatus from   22-28 Belarusian.  A 26-Belarusian resectoscope was then passed in the bladder with a   visualizing obturator.    At the level of the bulbar urethra, there was a mild stricture that was   dilated with the scope.    The bladder showed 3+ trabeculations with cellules.  In fact, the ureteral   orifices were very difficult to discern in this setting.  No bladder tumors   were seen.  No stones.    A monopolar loop was then employed to begin to create a trough at the 7   o'clock position.  This was carried back to the level of verumontanum.  From   here, this set up the resection of the median lobe, which was done down to the   capsule of the posterior prostate.    The right lateral lobe was then resected back, as was the left lateral lobe   having not violated the capsule and not resecting distal to the verumontanum.    Patient was fairly vascular and this did take quite a bit of time as   resection was carried out to maintain optimal visualization with the use of    cautery.    [place]  was then used to irrigate the bladder free of all prostate   chips.  One remaining median lobe remnant, however, was too large and required   resection in situ and was ultimately able to be grabbed and brought out.  At   low pressure filling, there appeared to be minimal bleeding.  There was wide   open prostatic fossa.  The resection did not appear to violate the trigone.    There was no capsular perforation identified.  There were no residual prostate   chips in the bladder.  The bladder was left partially full and the scope was   removed.  A 22-Serbian 3-way catheter was placed with continuous bladder   irrigation and was draining light pink on moderate rate.    He will be admitted overnight for observation for use of continuous bladder   irrigation and pain control.  Patient tolerated procedure well and was taken   to recovery room in stable condition.       ____________________________________     Francisco Valladares MD MCM / LI    DD:  04/11/2019 09:08:13  DT:  04/11/2019 12:12:31    D#:  [id number]  Job#:  [id number]

## 2019-04-11 NOTE — OR NURSING
POC reviewed with patient and his wife. Instructed to call for any needs. Call light in reach and bed in low position. Hourly rounding in place.

## 2019-04-11 NOTE — ANESTHESIA TIME REPORT
Anesthesia Start and Stop Event Times     Date Time Event    4/11/2019 0730 Anesthesia Start     0911 Anesthesia Stop        Responsible Staff  04/11/19    Name Role Begin End    John Dacosta M.D. Anesth 0730 0911        Preop Diagnosis (Free Text):  Pre-op Diagnosis     BENIGN PROSTATIC HYPERPLASIA WITH LOWER URINARY TRACT SYMPTOMS        Preop Diagnosis (Codes):  Diagnosis Information     Diagnosis Code(s):         Post op Diagnosis  Benign prostatic hyperplasia      Premium Reason  Non-Premium    Comments:

## 2019-04-11 NOTE — ANESTHESIA POSTPROCEDURE EVALUATION
Patient: Sharan Noe    Procedure Summary     Date:  04/11/19 Room / Location:  [place] [id number] / SURGERY [facility]    Anesthesia Start:  0730 Anesthesia Stop:  0911    Procedures:       CYSTOSCOPY (Bladder)      TURP (TRANSURETHRAL RESECTION OF PROSTATE) (Urethra) Diagnosis:  (BENIGN PROSTATIC HYPERPLASIA WITH LOWER URINARY TRACT SYMPTOMS)    Surgeon:  Francisco Valladares M.D. Responsible Provider:  John Dacosta M.D.    Anesthesia Type:  general ASA Status:  2          Final Anesthesia Type: general  Last vitals  BP    124/83    Temp   36.2 °C (97.2 °F)    Pulse   Pulse: 83   Resp   18    SpO2   93 %      Anesthesia Post Evaluation    Patient location during evaluation: PACU  Patient participation: complete - patient participated  Level of consciousness: sleepy but conscious  Pain score: 1    Airway patency: patent  Anesthetic complications: no  Cardiovascular status: hemodynamically stable  Respiratory status: acceptable  Hydration status: euvolemic    PONV: none           Nurse Pain Score: 2 (NPRS)

## 2019-04-11 NOTE — ANESTHESIA QCDR
2019 [facility] Clinical Data Registry (for Quality Improvement)     Postoperative nausea/vomiting risk protocol (Adult = 18 yrs and Pediatric 3-17 yrs)- (430 and 463)  General inhalation anesthetic (NOT TIVA) with PONV risk factors: Yes  Provision of anti-emetic therapy with at least 2 different classes of agents: Yes   Patient DID NOT receive anti-emetic therapy and reason is documented in Medical Record:  N/A    Multimodal Pain Management- (AQI59)  Patient undergoing Elective Surgery (i.e. Outpatient, or ASC, or Prescheduled Surgery prior to Hospital Admission): Yes  Use of Multimodal Pain Management, two or more drugs and/or interventions, NOT including systemic opioids: Yes   Exception: Documented allergy to multiple classes of analgesics:  N/A    PACU assessment of acute postoperative pain prior to Anesthesia Care End- Applies to Patients Age = 18- (ABG7)  Initial PACU pain score is which of the following: < 7/10  Patient unable to report pain score: N/A    Post-anesthetic transfer of care checklist/protocol to PACU/ICU- (426 and 427)  Upon conclusion of case, patient transferred to which of the following locations: PACU/Non-ICU  Use of transfer checklist/protocol: Yes  Exclusion: Service Performed in Patient Hospital Room (and thus did not require transfer): N/A    PACU Reintubation- (AQI31)  General anesthesia requiring endotracheal intubation (ETT) along with subsequent extubation in OR or PACU: Yes  Required reintubation in the PACU: No   Extubation was a planned trial documented in the medical record prior to removal of the original airway device:  N/A    Unplanned admission to ICU related to anesthesia service up through end of PACU care- (MD51)  Unplanned admission to ICU (not initially anticipated at anesthesia start time): No

## 2019-04-11 NOTE — OR NURSING
REPORT TO JAYLA TROTTER    PT CYSTOSCOPY/TURP /CBI BY DR ROB. CBI IN PROGRESS. PALE PINK NO CLOTS . PT TOLERATING WELL. REC'D  FENTANYL 50 MCGS IV  AND OXYCODONE 5  MG PO.    AXOX4. PRINCE. VSS. AFIB 80-90','S 140'S SYSTOLIC.

## 2019-04-11 NOTE — ANESTHESIA PREPROCEDURE EVALUATION
Relevant Problems   (+) Atrial fibrillation (HCC)   (+) Chronic atrial fibrillation (HCC)   (+) Coronary atherosclerosis due to calcified coronary lesions of native arteries, severe, asymptomatic       Physical Exam    Airway   Mallampati: II  TM distance: >3 FB  Neck ROM: full       Cardiovascular - normal exam  Rhythm: irregular  Rate: normal  (-) murmur     Dental - normal exam         Pulmonary - normal exam  Breath sounds clear to auscultation     Abdominal    Neurological - normal exam                 Anesthesia Plan    ASA 2       Plan - general       Airway plan will be LMA                  Informed Consent:

## 2019-04-11 NOTE — ANESTHESIA PROCEDURE NOTES
Airway  Date/Time: 4/11/2019 7:37 AM  Performed by: JENNIFER LANE  Authorized by: JENNIFER LANE     Location:  OR  Urgency:  Elective  Indications for Airway Management:  Anesthesia  Spontaneous Ventilation: absent    Sedation Level:  Deep  Preoxygenated: Yes    Patient Position:  Sniffing  Mask Difficulty Assessment:  1 - vent by mask  Final Airway Type:  Supraglottic airway  Final Supraglottic Airway:  Standard LMA  SGA Size:  5  Number of Attempts at Approach:  1

## 2019-04-11 NOTE — OR SURGEON
Immediate Post OP Note    PreOp Diagnosis: BPH with obstruction    PostOp Diagnosis: same    Procedure(s):  CYSTOSCOPY - Wound Class: Clean Contaminated  TURP (TRANSURETHRAL RESECTION OF PROSTATE) - Wound Class: Clean Contaminated    Surgeon(s):  Francisco Valladares M.D.    Anesthesiologist/Type of Anesthesia:  Anesthesiologist: John Dacosta M.D./General    Surgical Staff:  Circulator: LYNNE Rowland Scrub: Dayanara Mike  Scrub Person: Jv Severino    Specimens removed if any:  ID Type Source Tests Collected by Time Destination   A : PROSTATE CHIPS Tissue Prostate PATHOLOGY SPECIMEN Francisco Valladares M.D. 4/11/2019  8:05 AM        Estimated Blood Loss: 100    Findings: massive intravesical median lobe    Complications: none        4/11/2019 9:01 AM Francisco Valladares M.D.

## 2019-04-11 NOTE — CARE PLAN
Problem: Pain Management  Goal: Pain level will decrease to patient's comfort goal  Outcome: PROGRESSING AS EXPECTED  Will medicate per MAR prn    Problem: Fluid Volume:  Goal: Will maintain balanced intake and output  Outcome: PROGRESSING AS EXPECTED  CBI running

## 2019-04-11 NOTE — PROGRESS NOTES
"Assumed care of patient from PACU.  Patient is alert and oriented times 4, denies pain at this time.  VSS /83   Pulse 72   Temp 36.4 °C (97.6 °F) (Temporal)   Resp 16   Ht 1.854 m (6' 1\")   Wt 92.5 kg (203 lb 14.8 oz)   SpO2 96%   BMI 26.90 kg/m²   PIV in the LFA, patent and saline locked.  On 2L oxygen with saturations in the mid 90s.  History of Afib.  Last BM PTA.  3 way fong catheter in use, running CBI.  Stat lock in place and draining to gravity.  Skin fully intact.  Patient is a standby assist, demonstrates steady gait, minimal assistance needed.  Patient oriented to the unit, POC discussed for the day, bed is locked and in the lowest position, call light is within reach.  All needs are met at this time, hourly rounding is in place.  "

## 2019-04-12 VITALS
HEIGHT: 73 IN | SYSTOLIC BLOOD PRESSURE: 136 MMHG | DIASTOLIC BLOOD PRESSURE: 88 MMHG | TEMPERATURE: 97.4 F | OXYGEN SATURATION: 95 % | HEART RATE: 80 BPM | WEIGHT: 203.93 LBS | RESPIRATION RATE: 18 BRPM | BODY MASS INDEX: 27.03 KG/M2

## 2019-04-12 PROCEDURE — 700102 HCHG RX REV CODE 250 W/ 637 OVERRIDE(OP): Performed by: UROLOGY

## 2019-04-12 PROCEDURE — A9270 NON-COVERED ITEM OR SERVICE: HCPCS | Performed by: UROLOGY

## 2019-04-12 PROCEDURE — G0378 HOSPITAL OBSERVATION PER HR: HCPCS

## 2019-04-12 RX ADMIN — ACETAMINOPHEN 1000 MG: 500 TABLET ORAL at 05:46

## 2019-04-12 RX ADMIN — CARVEDILOL 3.12 MG: 6.25 TABLET, FILM COATED ORAL at 08:14

## 2019-04-12 NOTE — DISCHARGE INSTRUCTIONS
Discharge Instructions    Discharged to home by car with relative. Discharged via walking, hospital escort: Refused.  Special equipment needed: Not Applicable    Be sure to schedule a follow-up appointment with your primary care doctor or any specialists as instructed.     Discharge Plan:   Influenza Vaccine Indication: Not indicated: Previously immunized this influenza season and > 8 years of age    I understand that a diet low in cholesterol, fat, and sodium is recommended for good health. Unless I have been given specific instructions below for another diet, I accept this instruction as my diet prescription.   Other diet: regular    Special Instructions: None    · Is patient discharged on Warfarin / Coumadin?   No     Depression / Suicide Risk    As you are discharged from this [facility] facility, it is important to learn how to keep safe from harming yourself.    Recognize the warning signs:  · Abrupt changes in personality, positive or negative- including increase in energy   · Giving away possessions  · Change in eating patterns- significant weight changes-  positive or negative  · Change in sleeping patterns- unable to sleep or sleeping all the time   · Unwillingness or inability to communicate  · Depression  · Unusual sadness, discouragement and loneliness  · Talk of wanting to die  · Neglect of personal appearance   · Rebelliousness- reckless behavior  · Withdrawal from people/activities they love  · Confusion- inability to concentrate     If you or a loved one observes any of these behaviors or has concerns about self-harm, here's what you can do:  · Talk about it- your feelings and reasons for harming yourself  · Remove any means that you might use to hurt yourself (examples: pills, rope, extension cords, firearm)  · Get professional help from the community (Mental Health, Substance Abuse, psychological counseling)  · Do not be alone:Call your Safe Contact- someone whom you trust who will be there for  you.  · Call your local CRISIS HOTLINE 059-5965 or 566-952-9875  · Call your local Children's Mobile Crisis Response Team [place] (695) 053-4376 or www.Zuujit  · Call the toll free National Suicide Prevention Hotlines   · National Suicide Prevention Lifeline 586-416-BCNT (8407)  · [facility] 800-SUICIDE (620-2486)        Transurethral Resection of the Prostate  Transurethral resection of the prostate (TURP) is the removal (resection) of part of the gland that produces semen (prostate gland). This procedure is done to treat benign prostatic hyperplasia (BPH). BPH is an abnormal, noncancerous (benign) increase in the number of cells that make up the prostate tissue. BPH causes the prostate to get bigger. The enlarged prostate can push against or block the tube that drains urine from the bladder out of the body (urethra). BPH can affect normal urine flow by causing bladder infections, difficulty controlling bladder function, and difficulty emptying the bladder. The goal of TURP is to remove enough prostate tissue to allow for a normal flow of urine.  In a transurethral resection, a thin telescope with a light, a tiny camera, and an electric cutting edge (resectoscope) is passed through the urethra and into the prostate. The opening of the urethra is at the end of the penis.  Tell a health care provider about:  · Any allergies you have.  · All medicines you are taking, including vitamins, herbs, eye drops, creams, and over-the-counter medicines.  · Any problems you or family members have had with anesthetic medicines.  · Any blood disorders you have.  · Any surgeries you have had.  · Any medical conditions you have.  · Any prostate infections you have had.  What are the risks?  Generally, this is a safe procedure. However, problems may occur, including:  · Infection.  · Bleeding.  · Allergic reactions to medicines.  · Damage to other structures or organs, such as:  ¨ The urethra.  ¨ The  bladder.  ¨ Muscles that surround the prostate.  · Difficulty getting an erection.  · Difficulty controlling urination (incontinence).  · Scarring, which may cause problems with urine flow.  What happens before the procedure?  · Follow instructions from your health care provider about eating or drinking restrictions.  · Ask your health care provider about:  ¨ Changing or stopping your regular medicines. This is especially important if you are taking diabetes medicines or blood thinners.  ¨ Taking medicines such as aspirin and ibuprofen. These medicines can thin your blood. Do not take these medicines before your procedure if your health care provider instructs you not to.  · You may have a physical exam.  · You may have a blood or urine sample taken.  · You may be given antibiotic medicine to help prevent infection.  · Ask your health care provider how your surgical site will be marked or identified.  · Plan to have someone take you home after the procedure. You may not be able to drive for up to 10 days after your procedure.  What happens during the procedure?  · To reduce your risk of infection:  ¨ Your health care team will wash or sanitize their hands.  ¨ Your skin will be washed with soap.  · An IV tube will be inserted into one of your veins.  · You will be given one or more of the following:  ¨ A medicine to help you relax (sedative).  ¨ A medicine to make you fall asleep (general anesthetic).  ¨ A medicine that is injected into your spine to numb the area below and slightly above the injection site (spinal anesthetic).  · Your legs will be placed in foot rests (stirrups) so that your legs are apart and your knees are bent.  · The resectoscope will be passed through your urethra to your prostate.  · Parts of your prostate will be resected using the cutting edge of the resectoscope.  · The resectoscope will be removed.  · A thin, flexible tube (catheter) will be passed through your urethra and into your  bladder. The catheter will drain urine into a bag outside of your body.  ¨ Fluid may be passed through the catheter to keep the catheter open.  The procedure may vary among health care providers and hospitals.  What happens after the procedure?  · Your blood pressure, heart rate, breathing rate, and blood oxygen level will be monitored often until the medicines you were given have worn off.  · You may continue to receive fluids and medicines through an IV tube.  · You may have some pain. Pain medicine will be available to help you.  · You will have a catheter draining your urine.  ¨ You may have blood in your urine. Your catheter may be kept in until your urine is clear.  ¨ Your urinary drainage will be monitored. If necessary, your bladder may be rinsed out (irrigated) through your catheter.  · You will be encouraged to walk around as soon as possible.  · You may have to wear compression stockings. These stockings help prevent blood clots and reduce swelling in your legs.  · Do not drive for 24 hours if you received a sedative.  This information is not intended to replace advice given to you by your health care provider. Make sure you discuss any questions you have with your health care provider.  Document Released: 12/18/2006 Document Revised: 08/20/2017 Document Reviewed: 09/08/2016  Elsevier Interactive Patient Education © 2017 [employer]

## 2019-04-12 NOTE — PROGRESS NOTES
Chun removed per MD orders.  Voiding trial complete, patient able to void 200mL of urine.  Patient tolerated well.

## 2019-04-12 NOTE — CARE PLAN
Problem: Safety  Goal: Will remain free from falls  Outcome: PROGRESSING AS EXPECTED  Bed locked and in lowest position, side rails up x2, call light within reach, patient educated to call for assistance; patient steady on feet     Problem: Venous Thromboembolism (VTW)/Deep Vein Thrombosis (DVT) Prevention:  Goal: Patient will participate in Venous Thrombosis (VTE)/Deep Vein Thrombosis (DVT)Prevention Measures  Outcome: PROGRESSING AS EXPECTED  Patient ambulated unit x1.5, SCD's on while in bed

## 2019-04-12 NOTE — PROGRESS NOTES
Bedside report received.  Assessment complete.  A&O x 4. Patient calls appropriately.  Patient up with SB assist.    Patient has 0/10 pain. Declines intervention at this time  Denies N&V. Tolerating regular diet.  + void, + flatus, - BM.  Patient denies SOB.  SCD's on     Review plan with of care with patient. Call light and personal belongings with in reach. Hourly rounding in place. All needs met at this time.

## 2019-04-12 NOTE — DISCHARGE SUMMARY
Discharge Summary    CHIEF COMPLAINT ON ADMISSION  BPH      Reason for Admission  BENIGN PROSTATIC HYPERPLASIA WITH *     Admission Date  4/11/2019    CODE STATUS  Full Code    HPI & HOSPITAL COURSE  This is a 74 y.o. male here with BPH that is now S/P transurethral resection of the prostate 4/11.  He has done well following the procedure.  Denies pain.  Chun catheter has been draining well.  He was started on CBI; this was able to be titrated down and the output remained clear.  Voiding trial ordered, and to be removed prior to discharge. Tolerates regular diet, + flatus, no BM.  At this point he is stable for discharge and will discharge with a plan of close outpatient post op.       Therefore, he is discharged in good and stable condition to home with close outpatient follow-up.    The patient recovered much more quickly than anticipated on admission.    Discharge Date  04/12/19    FOLLOW UP ITEMS POST DISCHARGE  Post op with Dr. Valladares    DISCHARGE DIAGNOSES  BPH    FOLLOW UP  Post op with Dr. Valladares.    MEDICATIONS ON DISCHARGE     Medication List      CONTINUE taking these medications      Instructions   atorvastatin 10 MG Tabs  Commonly known as:  LIPITOR   Take 10 mg by mouth every day.  Dose:  10 mg     carvedilol 3.125 MG Tabs  Commonly known as:  COREG   Take 3.125 mg by mouth 2 times a day, with meals.  Dose:  3.125 mg     ELIQUIS 5mg Tabs  Generic drug:  apixaban   Take 5 mg by mouth 2 Times a Day.  Dose:  5 mg            Allergies  No Known Allergies    DIET  Orders Placed This Encounter   Procedures   • Diet Order Regular     Standing Status:   Standing     Number of Occurrences:   1     Order Specific Question:   Diet:     Answer:   Regular [1]       ACTIVITY  As tolerated.  Weight bearing as tolerated    CONSULTATIONS  None     PROCEDURES  Transurethral resection of the prostate 4/11    LABORATORY  Lab Results   Component Value Date    SODIUM 138 05/31/2017    POTASSIUM 4.5 05/31/2017     CHLORIDE 105 05/31/2017    CO2 27 05/31/2017    GLUCOSE 103 (H) 05/31/2017    BUN 19 05/31/2017    CREATININE 1.34 05/31/2017        Lab Results   Component Value Date    WBC 6.8 03/22/2019    HEMOGLOBIN 15.0 03/22/2019    HEMATOCRIT 44.2 03/22/2019    PLATELETCT 177 03/22/2019        Total time of the discharge process exceeds 31 minutes.

## 2019-04-12 NOTE — PROGRESS NOTES
"Assumed care of patient from night shift Rn.  Patient is alert and oriented times 4, denies pain at this time.  VSS /87   Pulse 72   Temp 36.8 °C (98.2 °F) (Temporal)   Resp 16   Ht 1.854 m (6' 1\")   Wt 92.5 kg (203 lb 14.8 oz)   SpO2 93%   BMI 26.90 kg/m²   PIV in the LFA, patent and saline locked.  On RA with saturations in the mid 90s.  Last BM PTA.  Chun catheter in use running CBI, no clots noted.  Pale pink urine.  Stat lock in place and draining to gravity.  Skin fully intact.  Patient is a standby assist, demonstrates steady gait, minimal assistance needed.  POC discussed for the day, possible discharge home.  Bed is locked and in the lowest position, call light is within reach.  All needs are met at this time, hourly rounding is in place.  "

## 2019-04-12 NOTE — CARE PLAN
Problem: Pain Management  Goal: Pain level will decrease to patient's comfort goal  Outcome: PROGRESSING AS EXPECTED  Will medicate per MAR prn    Problem: Mobility  Goal: Risk for activity intolerance will decrease  Outcome: PROGRESSING AS EXPECTED  Patient encouraged to ambulate with staff as tolerated

## 2019-04-12 NOTE — PROGRESS NOTES
Discharging Patient home per physician order.  Discharged with family.  Demonstrated understanding of discharge instructions, follow up appointments, home medications, prescriptions, home care for surgical wound, and nursing care instructions for TURP.  Ambulating without assistance, voiding without difficulty, pain well controlled, tolerating oral medications, oxygen saturation greater than 90% , tolerating diet.   Educational handouts given and discussed.  Verbalized understanding of discharge instructions and educational handouts.  All questions answered.  Belongings with patient at time of discharge.

## 2019-10-09 ENCOUNTER — IMMUNIZATION (OUTPATIENT)
Dept: SOCIAL WORK | Facility: CLINIC | Age: 74
End: 2019-10-09
Payer: MEDICARE

## 2019-10-09 DIAGNOSIS — Z23 NEED FOR VACCINATION: ICD-10-CM

## 2019-10-09 PROCEDURE — G0008 ADMIN INFLUENZA VIRUS VAC: HCPCS | Performed by: REGISTERED NURSE

## 2019-10-09 PROCEDURE — 90662 IIV NO PRSV INCREASED AG IM: CPT | Performed by: REGISTERED NURSE

## 2021-01-15 DIAGNOSIS — Z23 NEED FOR VACCINATION: ICD-10-CM

## 2021-01-28 ENCOUNTER — IMMUNIZATION (OUTPATIENT)
Dept: FAMILY PLANNING/WOMEN'S HEALTH CLINIC | Facility: IMMUNIZATION CENTER | Age: 76
End: 2021-01-28
Payer: MEDICARE

## 2021-01-28 DIAGNOSIS — Z23 ENCOUNTER FOR VACCINATION: Primary | ICD-10-CM

## 2021-01-28 PROCEDURE — 0011A MODERNA SARS-COV-2 VACCINE: CPT

## 2021-01-28 PROCEDURE — 91301 MODERNA SARS-COV-2 VACCINE: CPT

## 2021-02-25 ENCOUNTER — IMMUNIZATION (OUTPATIENT)
Dept: FAMILY PLANNING/WOMEN'S HEALTH CLINIC | Facility: IMMUNIZATION CENTER | Age: 76
End: 2021-02-25
Attending: INTERNAL MEDICINE
Payer: MEDICARE

## 2021-02-25 DIAGNOSIS — Z23 ENCOUNTER FOR VACCINATION: Primary | ICD-10-CM

## 2021-02-25 PROCEDURE — 0012A MODERNA SARS-COV-2 VACCINE: CPT | Performed by: INTERNAL MEDICINE

## 2021-02-25 PROCEDURE — 91301 MODERNA SARS-COV-2 VACCINE: CPT | Performed by: INTERNAL MEDICINE

## 2021-10-07 ENCOUNTER — OFFICE VISIT (OUTPATIENT)
Dept: INTERNAL MEDICINE | Facility: OTHER | Age: 76
End: 2021-10-07
Payer: MEDICARE

## 2021-10-07 VITALS
WEIGHT: 196.4 LBS | OXYGEN SATURATION: 95 % | HEART RATE: 80 BPM | BODY MASS INDEX: 26.03 KG/M2 | DIASTOLIC BLOOD PRESSURE: 105 MMHG | HEIGHT: 73 IN | TEMPERATURE: 97.6 F | SYSTOLIC BLOOD PRESSURE: 154 MMHG

## 2021-10-07 DIAGNOSIS — R10.84 GENERALIZED ABDOMINAL PAIN: ICD-10-CM

## 2021-10-07 DIAGNOSIS — M54.50 CHRONIC LOW BACK PAIN WITHOUT SCIATICA, UNSPECIFIED BACK PAIN LATERALITY: ICD-10-CM

## 2021-10-07 DIAGNOSIS — R15.9 INCONTINENCE OF FECES, UNSPECIFIED FECAL INCONTINENCE TYPE: ICD-10-CM

## 2021-10-07 DIAGNOSIS — M79.641 RIGHT HAND PAIN: Chronic | ICD-10-CM

## 2021-10-07 DIAGNOSIS — G89.29 CHRONIC LOW BACK PAIN WITHOUT SCIATICA, UNSPECIFIED BACK PAIN LATERALITY: ICD-10-CM

## 2021-10-07 DIAGNOSIS — I10 ESSENTIAL (PRIMARY) HYPERTENSION: ICD-10-CM

## 2021-10-07 PROBLEM — U07.1 DISEASE DUE TO SEVERE ACUTE RESPIRATORY SYNDROME CORONAVIRUS 2 (SARS-COV-2): Status: ACTIVE | Noted: 2021-02-11

## 2021-10-07 PROBLEM — Z28.39 BEHIND ON IMMUNIZATIONS: Status: RESOLVED | Noted: 2017-06-22 | Resolved: 2021-10-07

## 2021-10-07 PROBLEM — Z00.00 WELL ADULT EXAM: Status: RESOLVED | Noted: 2017-06-22 | Resolved: 2021-10-07

## 2021-10-07 PROBLEM — H91.93 HEARING LOSS, BILATERAL: Status: ACTIVE | Noted: 2019-08-27

## 2021-10-07 PROBLEM — M54.9 BACKACHE: Status: ACTIVE | Noted: 2021-10-07

## 2021-10-07 PROBLEM — G45.3 AMAUROSIS FUGAX: Status: ACTIVE | Noted: 2021-10-07

## 2021-10-07 PROBLEM — E04.2 NONTOXIC MULTINODULAR GOITER: Status: ACTIVE | Noted: 2021-10-07

## 2021-10-07 PROBLEM — N13.9 OBSTRUCTION OF URINARY TRACT: Status: ACTIVE | Noted: 2019-08-26

## 2021-10-07 PROBLEM — E78.5 HYPERLIPIDEMIA, UNSPECIFIED: Status: ACTIVE | Noted: 2019-08-27

## 2021-10-07 PROBLEM — M25.511 SHOULDER PAIN, RIGHT: Status: ACTIVE | Noted: 2020-02-20

## 2021-10-07 PROBLEM — I65.29 CAROTID ARTERY STENOSIS: Status: ACTIVE | Noted: 2019-08-26

## 2021-10-07 PROCEDURE — 99214 OFFICE O/P EST MOD 30 MIN: CPT | Mod: GC | Performed by: INTERNAL MEDICINE

## 2021-10-07 RX ORDER — POLYETHYLENE GLYCOL 3350 17 G/17G
17 POWDER, FOR SOLUTION ORAL DAILY
Qty: 30 EACH | Refills: 2 | Status: SHIPPED | OUTPATIENT
Start: 2021-10-07 | End: 2021-10-26

## 2021-10-07 RX ORDER — DICLOFENAC SODIUM 30 MG/G
0.5 GEL TOPICAL 2 TIMES DAILY
Qty: 50 G | Refills: 1 | Status: SHIPPED | OUTPATIENT
Start: 2021-10-07 | End: 2021-10-13

## 2021-10-07 ASSESSMENT — PATIENT HEALTH QUESTIONNAIRE - PHQ9: CLINICAL INTERPRETATION OF PHQ2 SCORE: 0

## 2021-10-07 NOTE — PATIENT INSTRUCTIONS
-Continue medications as directed  -Continue lifestyle modifications as discussed - more fiber diet and stay hydrated  -Referral made to physical therapy  -Referral made to neurosurgery  -Referral made to rheumatology  -Follow up visit in 2 weeks when Dr. Peters is in clinic

## 2021-10-07 NOTE — PROGRESS NOTES
Established Patient    Chief Complaint   Patient presents with   • Follow-Up     stomach ache/stomach issue    • Referral Needed     Arthritis of hands    • Bowel Problem     HPI: Mr.John Mahendra Noe is a 76 y.o. male who presented to the clinic for the following.  Accompanied by his wife to the visit.    #Stomach issues: Patient complains of abdominal pain for the past 2 to 3 months after eating food, followed by either constipation or diarrhea.  Denies any nausea, vomiting, hematemesis, melena, hematochezia, fever, chills or weight loss.  Denies any recent dietary changes.  Admits to drinking a lot of Gatorade.  Reports that he is going through stress recently.  Had a colonoscopy at the age of 73 which was WNL.    #Right hand pain: Labs in May 2021 showed negative for rheumatoid factor and anti-CCP.  X-ray at that time showed arthritic changes involving the third and fourth DIPs.  He was referred to rheumatology during prior visit.  He states that he received a call from rheumatology saying that they are no longer accepting new patients.  He wants to follow-up on the referral.  Pain in his right hand is ongoing, unable to hold things, has pain all day long, gets better with hot water shower and doing exercises with the ball.  Never tried physical therapy in the past.    #Bowel incontinence: He has a history of chronic back pain from degenerative disc disease s/p laminectomy in 2003 at [facility].  He gets epidural injections, last received in June 2021.  He had 3 episodes of bowel incontinence since June after the epidural injection.  Denies any bladder incontinence or saddle anesthesia or leg weakness or paresthesias.  His low back pain is at his baseline, no new changes.    Patient Active Problem List    Diagnosis Date Noted   • Generalized abdominal pain 10/07/2021   • Bowel incontinence 10/07/2021   • Right hand pain 10/07/2021   • Essential (primary) hypertension 08/26/2019   • Amaurosis fugax  10/07/2021   • Backache 10/07/2021   • Nontoxic multinodular goiter 10/07/2021   • Disease due to severe acute respiratory syndrome coronavirus 2 (SARS-CoV-2) 02/11/2021   • Shoulder pain, right 02/20/2020   • Hearing loss, bilateral 08/27/2019   • Hyperlipidemia, unspecified 08/27/2019   • Carotid artery stenosis 08/26/2019   • Obstruction of urinary tract 08/26/2019   • Chronic atrial fibrillation (HCC) 06/22/2017   • Agatston CAC score, >400 06/22/2017   • Elevated fasting glucose 06/22/2017   • Total bilirubin, elevated 06/22/2017   • Vitamin D deficiency 06/22/2017   • Coronary atherosclerosis due to calcified coronary lesions of native arteries, severe, asymptomatic 06/22/2017   • Thoracic aorta atherosclerosis (HCC) 06/22/2017   • Calculus of gallbladder without cholecystitis without obstruction 06/22/2017   • Benign non-nodular prostatic hyperplasia with lower urinary tract symptoms 06/22/2017   • Family history of ischemic heart disease (IHD)    • Family history of hypertension in mother    • Family history of pancreatic cancer    • Carpal tunnel syndrome of right wrist 12/21/2015       Current Outpatient Medications   Medication Sig Dispense Refill   • polyethylene glycol/lytes (MIRALAX) 17 g Pack Take 1 Packet by mouth every day. 30 Each 2   • diclofenac sodium 3 % Gel Apply 0.5 g topically 2 times a day. 50 g 1   • carvedilol (COREG) 3.125 MG Tab Take 3.125 mg by mouth 2 times a day, with meals.     • apixaban (ELIQUIS) 5mg Tab Take 5 mg by mouth 2 Times a Day.     • atorvastatin (LIPITOR) 10 MG Tab Take 10 mg by mouth every day.       No current facility-administered medications for this visit.     ROS: As per HPI. Additional pertinent systems as noted below.  Constitutional:  Negative for fever, chills   HENT:  Negative for ear pain, sore throat, runny nose   Eyes:  Negative for blurred vision and double vision   Cardiovascular:  Negative for chest pain, palpitations   Respiratory:  Negative for cough,  "sputum production, shortness of breath   Gastrointestinal: See HPI  Genitourinary: Negative for dysuria, flank pain and hematuria  Skin: Negative for rash, itching  Extremities: See HPI  Neurologic: Negative for headaches, dizziness, weakness     /105 (BP Location: Right arm, Patient Position: Sitting, BP Cuff Size: Large adult)   Pulse 80   Temp 36.4 °C (97.6 °F) (Temporal)   Ht 1.854 m (6' 1\")   Wt 89.1 kg (196 lb 6.4 oz)   SpO2 95%   BMI 25.91 kg/m²     Physical Exam   Constitutional:  Well developed, well nourished. Not in acute distress   HENT:  Normocephalic, Atraumatic, Oropharynx is pink and moist. No oral exudates, Nose normal   Eyes:  EOMI, Conjunctiva normal, No discharge. PERRLA   Neck:  Normal range of motion  Cardiovascular:  Regular rate and rhythm, trace pedal edema, Intact distal pulses   Respiratory: Clear to auscultation bilaterally, No use of accessory muscles   Gastrointestinal: Bowel sounds normal, Soft, minimal diffuse tenderness, No palpable masses/hepatosplenomegaly   Skin: Warm, Dry, No erythema, No rash, no induration.   Musculoskeletal: No cyanosis or clubbing, normal ROM   Neurologic: Alert & oriented x 4, No focal deficits noted    Note: I have reviewed all pertinent labs and diagnostic tests associated with this visit with specific comments listed under the assessment and plan below    Assessment and Plan  Right hand pain  -Chronic, ongoing, stiffness mostly present all day, getting better with hot water shower, exercising with a ball and using compression glove  -Work-up in the past showed negative anti-RF and anti-CCP, x-ray showed arthritis of 3rd and 4th DIPs  -Less likely rheumatoid arthritis with the DIP involvement and negative autoimmune work-up.  No clear history of morning stiffness or symmetric joint involvement  -More likely osteoarthritis  -Symptomatic management with topical diclofenac, hot or cold compresses and Tylenol as needed  -Referral made to physical " therapy  -Referral made to rheumatology during prior visit, however they are not accepting new patients at this time.  Followed up with the referral specialist.  The only option at this time is to go to [place].  PCP to follow-up on during next visit    Essential (primary) hypertension  -Blood pressure at today's visit 154/105.  Patient not monitoring consistently at home.  Reports he did not take the blood pressure medication today.  -No alarming symptoms  -Continue current medications  -Advised to monitor blood pressures at home, maintain a log and bring to next visit    Bowel incontinence  -Chronic back pain secondary to degenerative disc disease s/p laminectomy in 2003 and on epidural injections, last in June 2021.  Denies any bladder incontinence or saddle anesthesia focal deficits  -Referral made to neurosurgery for possible imaging and work-up as needed  -Lifestyle modifications: Dietary changes including more fiber diet and plenty of fluids, avoiding Gatorade discussed  -Follow-up in 2 weeks    Generalized abdominal pain  -Diffuse abdominal pain for the past 2 to 3 months after eating food, followed by either constipation or diarrhea. Reports ongoing stress. Colonoscopy at the age of 73 which was WNL. No h/o IBD  -Ordered abdominal x-ray 3 view  -Lifestyle modifications: Dietary changes including high-fiber diet, plenty of fluids and avoiding Gatorade discussed  -Started on MiraLAX  -Follow-up in 2 weeks      Followup: Return in about 2 years (around 10/7/2023).    Patient seen with attending.    Signed by: Francoise Rizvi M.D.    Please note that this dictation was created using voice recognition software. I have made every reasonable attempt to correct obvious errors, but I expect that there are errors of grammar and possibly content that I did not discover before finalizing the note.

## 2021-10-08 DIAGNOSIS — M79.641 RIGHT HAND PAIN: Chronic | ICD-10-CM

## 2021-10-08 NOTE — ASSESSMENT & PLAN NOTE
-Blood pressure at today's visit 154/105.  Patient not monitoring consistently at home.  Reports he did not take the blood pressure medication today.  -No alarming symptoms  -Continue current medications  -Advised to monitor blood pressures at home, maintain a log and bring to next visit

## 2021-10-08 NOTE — ASSESSMENT & PLAN NOTE
-Chronic, ongoing, stiffness mostly present all day, getting better with hot water shower, exercising with a ball and using compression glove  -Work-up in the past showed negative anti-RF and anti-CCP, x-ray showed arthritis of 3rd and 4th DIPs  -Less likely rheumatoid arthritis with the DIP involvement and negative autoimmune work-up.  No clear history of morning stiffness or symmetric joint involvement  -More likely osteoarthritis  -Symptomatic management with topical diclofenac, hot or cold compresses and Tylenol as needed  -Referral made to physical therapy  -Referral made to rheumatology during prior visit, however they are not accepting new patients at this time.  Followed up with the referral specialist.  The only option at this time is to go to [place].  PCP to follow-up on during next visit

## 2021-10-08 NOTE — ASSESSMENT & PLAN NOTE
-Diffuse abdominal pain for the past 2 to 3 months after eating food, followed by either constipation or diarrhea. Reports ongoing stress. Colonoscopy at the age of 73 which was WNL. No h/o IBD  -Ordered abdominal x-ray 3 view  -Lifestyle modifications: Dietary changes including high-fiber diet, plenty of fluids and avoiding Gatorade discussed  -Started on MiraLAX  -Follow-up in 2 weeks

## 2021-10-08 NOTE — ASSESSMENT & PLAN NOTE
-Chronic back pain secondary to degenerative disc disease s/p laminectomy in 2003 and on epidural injections, last in June 2021.  Denies any bladder incontinence or saddle anesthesia focal deficits  -Referral made to neurosurgery for possible imaging and work-up as needed  -Lifestyle modifications: Dietary changes including more fiber diet and plenty of fluids, avoiding Gatorade discussed  -Follow-up in 2 weeks

## 2021-10-12 NOTE — TELEPHONE ENCOUNTER
Changed diclofenac to 1 %      Last seen: 07/26/2021 by Dr. Leos   Next appt: 10/26/2021 with Dr. Ospina    Was the patient seen in the last year in this department? Yes   Does patient have an active prescription for medications requested? No   Received Request Via: Pharmacy

## 2021-10-13 RX ORDER — DICLOFENAC SODIUM 30 MG/G
GEL TOPICAL
Qty: 100 G | Refills: 1 | Status: SHIPPED | OUTPATIENT
Start: 2021-10-13 | End: 2021-10-13

## 2021-10-25 ENCOUNTER — TELEPHONE (OUTPATIENT)
Dept: INTERNAL MEDICINE | Facility: OTHER | Age: 76
End: 2021-10-25

## 2021-10-26 ENCOUNTER — OFFICE VISIT (OUTPATIENT)
Dept: INTERNAL MEDICINE | Facility: OTHER | Age: 76
End: 2021-10-26
Payer: MEDICARE

## 2021-10-26 VITALS
BODY MASS INDEX: 26.08 KG/M2 | WEIGHT: 196.8 LBS | DIASTOLIC BLOOD PRESSURE: 79 MMHG | OXYGEN SATURATION: 95 % | TEMPERATURE: 98.2 F | HEIGHT: 73 IN | HEART RATE: 85 BPM | SYSTOLIC BLOOD PRESSURE: 132 MMHG

## 2021-10-26 DIAGNOSIS — R10.84 GENERALIZED POSTPRANDIAL ABDOMINAL PAIN: ICD-10-CM

## 2021-10-26 DIAGNOSIS — M19.041 OSTEOARTHRITIS OF BOTH HANDS, UNSPECIFIED OSTEOARTHRITIS TYPE: ICD-10-CM

## 2021-10-26 DIAGNOSIS — I10 ESSENTIAL (PRIMARY) HYPERTENSION: ICD-10-CM

## 2021-10-26 DIAGNOSIS — I25.10 CORONARY ARTERY DISEASE WITHOUT ANGINA PECTORIS, UNSPECIFIED VESSEL OR LESION TYPE, UNSPECIFIED WHETHER NATIVE OR TRANSPLANTED HEART: ICD-10-CM

## 2021-10-26 DIAGNOSIS — M19.042 OSTEOARTHRITIS OF BOTH HANDS, UNSPECIFIED OSTEOARTHRITIS TYPE: ICD-10-CM

## 2021-10-26 PROCEDURE — 99214 OFFICE O/P EST MOD 30 MIN: CPT | Mod: GC | Performed by: STUDENT IN AN ORGANIZED HEALTH CARE EDUCATION/TRAINING PROGRAM

## 2021-10-26 RX ORDER — LOSARTAN POTASSIUM 25 MG/1
25 TABLET ORAL DAILY
Qty: 60 TABLET | Refills: 1 | Status: SHIPPED | OUTPATIENT
Start: 2021-10-26 | End: 2021-12-20 | Stop reason: SDUPTHER

## 2021-10-26 ASSESSMENT — ENCOUNTER SYMPTOMS
BACK PAIN: 0
BLOOD IN STOOL: 0
DEPRESSION: 0
BRUISES/BLEEDS EASILY: 0
TINGLING: 0
DIZZINESS: 0
HEARTBURN: 0
HEADACHES: 0
CHILLS: 0
VOMITING: 0
SHORTNESS OF BREATH: 0
NERVOUS/ANXIOUS: 0
BLURRED VISION: 0
NAUSEA: 0
WEIGHT LOSS: 0
SPEECH CHANGE: 0
PHOTOPHOBIA: 0
FEVER: 0
COUGH: 0
DOUBLE VISION: 0
SORE THROAT: 0
HEMOPTYSIS: 0
PALPITATIONS: 0
SPUTUM PRODUCTION: 0
NECK PAIN: 0
MYALGIAS: 0
SENSORY CHANGE: 0
ORTHOPNEA: 0
ABDOMINAL PAIN: 0

## 2021-10-26 ASSESSMENT — LIFESTYLE VARIABLES: SUBSTANCE_ABUSE: 0

## 2021-10-26 NOTE — PROGRESS NOTES
Chief Complaint   Patient presents with   • Follow-Up     2 Week Follow up, stomach issues, back issues, right hand issue       HISTORY OF PRESENT ILLNESS: Patient is a 76 y.o. male established patient who presents today for the following.    Mr. Noe is here for follow up seen two weeks ago for post-prandial abdominal pain.  He states that for the past week or two has not had any new episodes of pain after meals. He has known CAD so I explained concerns that this time of pain might be related to atherosclerosis.  Patient also states that his hand pain has not improved at all, he was prescribed diclogenac gel 3% but not covered by Insurance, due to cost he rather  got 1% and has used couple times only.  He brings today his BP log which shows elevated BP most of his readings between high 140's to mid 150's SBP he is currently taking Carvedilol for A fib but no other BP meds.      1. Coronary artery disease without angina pectoris, unspecified vessel or lesion type, unspecified whether native or transplanted heart    2. Generalized postprandial abdominal pain    3. Essential (primary) hypertension    4. Osteoarthritis of both hands, unspecified osteoarthritis type      Past Medical History:   Diagnosis Date   • A-fib (HCC) years    years   • Arthritis     hands   • Family history of hypertension in mother    • Family history of ischemic heart disease (IHD)    • Family history of pancreatic cancer     sister   • Goiter        Patient Active Problem List    Diagnosis Date Noted   • Amaurosis fugax 10/07/2021   • Backache 10/07/2021   • Nontoxic multinodular goiter 10/07/2021   • Bowel incontinence 10/07/2021   • Generalized abdominal pain 10/07/2021   • Right hand pain 10/07/2021   • Disease due to severe acute respiratory syndrome coronavirus 2 (SARS-CoV-2) 02/11/2021   • Shoulder pain, right 02/20/2020   • Hearing loss, bilateral 08/27/2019   • Hyperlipidemia, unspecified 08/27/2019   • Carotid artery stenosis  08/26/2019   • Essential (primary) hypertension 08/26/2019   • Obstruction of urinary tract 08/26/2019   • Chronic atrial fibrillation (HCC) 06/22/2017   • Agatston CAC score, >400 06/22/2017   • Elevated fasting glucose 06/22/2017   • Total bilirubin, elevated 06/22/2017   • Vitamin D deficiency 06/22/2017   • Coronary atherosclerosis due to calcified coronary lesions of native arteries, severe, asymptomatic 06/22/2017   • Thoracic aorta atherosclerosis (HCC) 06/22/2017   • Calculus of gallbladder without cholecystitis without obstruction 06/22/2017   • Benign non-nodular prostatic hyperplasia with lower urinary tract symptoms 06/22/2017   • Family history of ischemic heart disease (IHD)    • Family history of hypertension in mother    • Family history of pancreatic cancer    • Carpal tunnel syndrome of right wrist 12/21/2015       Allergies:Patient has no known allergies.    Current Outpatient Medications   Medication Sig Dispense Refill   • losartan (COZAAR) 25 MG Tab Take 1 Tablet by mouth every day. 60 Tablet 1   • diclofenac sodium (VOLTAREN) 1 % Gel Apply 2 g topically 4 times a day as needed. 2 g 2   • carvedilol (COREG) 3.125 MG Tab Take 3.125 mg by mouth 2 times a day, with meals.     • apixaban (ELIQUIS) 5mg Tab Take 5 mg by mouth 2 Times a Day.     • atorvastatin (LIPITOR) 10 MG Tab Take 10 mg by mouth every day.       No current facility-administered medications for this visit.       Social History     Tobacco Use   • Smoking status: Never Smoker   • Smokeless tobacco: Never Used   Substance Use Topics   • Alcohol use: Yes     Alcohol/week: 0.0 oz     Comment: rarely   • Drug use: No       Family History   Problem Relation Age of Onset   • Heart Attack Father 89   • Hypertension Mother    • Dementia Mother 80   • Cancer Sister 68        pancreatic   • Other Sister         Brain aneurysm         Review of Systems   Review of Systems   Constitutional: Negative for chills, fever, malaise/fatigue and weight  "loss.   HENT: Negative for ear pain, hearing loss, sore throat and tinnitus.    Eyes: Negative for blurred vision, double vision and photophobia.   Respiratory: Negative for cough, hemoptysis, sputum production and shortness of breath.    Cardiovascular: Negative for chest pain, palpitations, orthopnea and leg swelling.   Gastrointestinal: Negative for abdominal pain, blood in stool, heartburn, melena, nausea and vomiting.   Genitourinary: Negative for dysuria, frequency, hematuria and urgency.   Musculoskeletal: Positive for joint pain. Negative for back pain, myalgias and neck pain.   Skin: Negative for itching and rash.   Neurological: Negative for dizziness, tingling, sensory change, speech change and headaches.   Endo/Heme/Allergies: Negative for environmental allergies. Does not bruise/bleed easily.   Psychiatric/Behavioral: Negative for depression, substance abuse and suicidal ideas. The patient is not nervous/anxious.        Exam:  /79 (BP Location: Right arm, Patient Position: Sitting, BP Cuff Size: Adult)   Pulse 85   Temp 36.8 °C (98.2 °F)   Ht 1.854 m (6' 1\")   Wt 89.3 kg (196 lb 12.8 oz)   SpO2 95%  Body mass index is 25.96 kg/m².     Constitutional:  Not in acute distress, well appearing.  HEENT:   NC/AT  Cardiovascular: Regular rate and rhythm. No murmurs or gallops.      Lungs:   Clear to auscultation bilaterally. No wheezes or crackles. No respiratory distress.  Abdomen: Not distended, soft, not tender. No guarding or rigidity. No masses.  Extremities:  No cyanosis/clubbing/edema. No obvious deformities.  Bilateral hands with mild swelling in multiple DIP and interphalangeal joints, patient also has mild pain when he makes a fist in his right hand. No nodules appreciated, he also has some deformities, he has Hx of multiple injuries in both hands related with injuries playing basket-ball and foot ball.   Skin:  Warm and dry.  No visible rashes.  Neurologic: Alert & oriented x 3, CN II-XII " grossly intact, strength and sensation grossly intact.  No focal deficits noted.  Psychiatric:  Affect normal, mood normal, judgment normal.    Assessment/Plan:     1. Coronary artery disease without angina pectoris, unspecified vessel or lesion type, unspecified whether native or transplanted heart  - Patient presented two weeks ago for postprandial abdominal pain  - Due to Hx of CAD we would like to rule out Atherosclerosis related abdominal pain  - Symptoms have improved in the past week or two          PLAN:  - CTA abdomen  - Increasing Statin to 40 mg daily  - Small meals  - Follow up in 2 months  - Follow up with Cards    - CT-CTA ABDOMEN WITH & W/O-POST PROCESS; Future    2. Generalized postprandial abdominal pain  - See above    - CT-CTA ABDOMEN WITH & W/O-POST PROCESS; Future    3. Essential (primary) hypertension  - BP log past two weeks elevated BP  - Ranging high 140's to mid 150's SBP  - Patient asymptomatic  - Currently only on Carvedilol for A fib  - Today's BP normotensive at office        PLAN:  - Adding Losartan 25 mg daily  - Low salt diet, regular exercise  - Daily BP monitoring  - BMP 8 days after starting BP med  - Follow up in 2 months  - Call if high BP continues or BP lower than 110    - losartan (COZAAR) 25 MG Tab; Take 1 Tablet by mouth every day.  Dispense: 60 Tablet; Refill: 1  - Basic Metabolic Panel; Future    4. Osteoarthritis of both hands, unspecified osteoarthritis type  - Imaging from past visit showed multiple joints with space narrowing both hands and chronic pain  - Prescribed Diclofenac cream unable to get it till recently and used couple of times  - Advised to continue daily using above BID, TID  - Follow up in 2 months  - Considering using systemic drugs if improvement failure      All imaging results and lab results and consult notes are reviewed at this visit.  Followup: Return in about 2 months (around 12/26/2021).    Please note that this dictation was created using  voice recognition software. I have made every reasonable attempt to correct obvious errors, but I expect that there are errors of grammar and possibly content that I did not discover before finalizing the note.

## 2021-10-26 NOTE — PATIENT INSTRUCTIONS
- Continue home meds  - Start Losartan 25 mg daily  - Daily BP monitoring  - Daily BID diclofenac  - Get labs and CTA  - Follow up in 2 months

## 2021-10-27 ENCOUNTER — TELEPHONE (OUTPATIENT)
Dept: INTERNAL MEDICINE | Facility: OTHER | Age: 76
End: 2021-10-27

## 2021-10-27 RX ORDER — ATORVASTATIN CALCIUM 10 MG/1
40 TABLET, FILM COATED ORAL DAILY
Qty: 30 TABLET | Refills: 3 | Status: SHIPPED | OUTPATIENT
Start: 2021-10-27 | End: 2021-10-27

## 2021-10-27 RX ORDER — ATORVASTATIN CALCIUM 40 MG/1
40 TABLET, FILM COATED ORAL DAILY
Qty: 60 TABLET | Refills: 3 | Status: SHIPPED | OUTPATIENT
Start: 2021-10-27 | End: 2021-12-20 | Stop reason: SDUPTHER

## 2021-11-05 ENCOUNTER — TELEPHONE (OUTPATIENT)
Dept: INTERNAL MEDICINE | Facility: OTHER | Age: 76
End: 2021-11-05

## 2021-11-05 NOTE — TELEPHONE ENCOUNTER
10/26 a CTA was ordered- but still not scheduled- probably should have been STAT- or at least urgent- can you please get this scheduled soon- thanks!

## 2021-12-09 ENCOUNTER — OFFICE VISIT (OUTPATIENT)
Dept: INTERNAL MEDICINE | Facility: OTHER | Age: 76
End: 2021-12-09
Payer: MEDICARE

## 2021-12-09 VITALS
WEIGHT: 193 LBS | OXYGEN SATURATION: 96 % | DIASTOLIC BLOOD PRESSURE: 72 MMHG | HEIGHT: 73 IN | SYSTOLIC BLOOD PRESSURE: 112 MMHG | HEART RATE: 84 BPM | TEMPERATURE: 98.6 F | BODY MASS INDEX: 25.58 KG/M2

## 2021-12-09 DIAGNOSIS — Z29.89 ENCOUNTER FOR HYDRATION PRIOR TO CT SCAN: ICD-10-CM

## 2021-12-09 DIAGNOSIS — R10.84 GENERALIZED ABDOMINAL PAIN: ICD-10-CM

## 2021-12-09 DIAGNOSIS — I10 ESSENTIAL (PRIMARY) HYPERTENSION: ICD-10-CM

## 2021-12-09 DIAGNOSIS — R93.1 AGATSTON CAC SCORE, >400: ICD-10-CM

## 2021-12-09 DIAGNOSIS — M79.641 RIGHT HAND PAIN: ICD-10-CM

## 2021-12-09 PROBLEM — N13.9 OBSTRUCTION OF URINARY TRACT: Status: RESOLVED | Noted: 2019-08-26 | Resolved: 2021-12-09

## 2021-12-09 PROBLEM — R17 TOTAL BILIRUBIN, ELEVATED: Status: RESOLVED | Noted: 2017-06-22 | Resolved: 2021-12-09

## 2021-12-09 PROBLEM — M25.511 SHOULDER PAIN, RIGHT: Status: RESOLVED | Noted: 2020-02-20 | Resolved: 2021-12-09

## 2021-12-09 PROBLEM — R15.9 BOWEL INCONTINENCE: Status: RESOLVED | Noted: 2021-10-07 | Resolved: 2021-12-09

## 2021-12-09 PROBLEM — M54.9 BACKACHE: Status: RESOLVED | Noted: 2021-10-07 | Resolved: 2021-12-09

## 2021-12-09 PROCEDURE — 99214 OFFICE O/P EST MOD 30 MIN: CPT | Mod: GC | Performed by: STUDENT IN AN ORGANIZED HEALTH CARE EDUCATION/TRAINING PROGRAM

## 2021-12-09 ASSESSMENT — ENCOUNTER SYMPTOMS
FOCAL WEAKNESS: 0
CLAUDICATION: 0
DEPRESSION: 0
DIZZINESS: 0
PALPITATIONS: 0
NAUSEA: 0
ORTHOPNEA: 0
SPEECH CHANGE: 0
NERVOUS/ANXIOUS: 0
BACK PAIN: 0
HEMOPTYSIS: 0
FEVER: 0
PHOTOPHOBIA: 0
NECK PAIN: 0
SORE THROAT: 0
COUGH: 0
BLURRED VISION: 0
BRUISES/BLEEDS EASILY: 0
DOUBLE VISION: 0
SPUTUM PRODUCTION: 0
WEIGHT LOSS: 0
BLOOD IN STOOL: 0
CHILLS: 0
MYALGIAS: 0
EYE PAIN: 0
TINGLING: 0
HEARTBURN: 0
VOMITING: 0
SHORTNESS OF BREATH: 0
HEADACHES: 0

## 2021-12-09 ASSESSMENT — LIFESTYLE VARIABLES: SUBSTANCE_ABUSE: 0

## 2021-12-09 NOTE — PROGRESS NOTES
Established Patient    Patient Care Team:  Carlo Peters M.D. as PCP - General    HPI:  Sharan Noe is a 76 y.o. male who presents today with the following Chief Complaint(s):   Chief Complaint   Patient presents with   • Follow-Up     Stomach issues have gone away     Patient here for follow up appointment to assess BP, hand pain multiple joints right hand, abdominal pain.  In previous visit sent for CT abdomen but did not follow.  Also has not followed with PT order placed prior I saw him.  He is been taking Losartan 25 mg started last visit with good response in BP. He has not checked his BP daily but apparently his BP much better, most recent few days 120's range, here at office 112/72.  He was not able to get Voltaren gel till recent days, got OTC 1% since 3% is not cover for Insurances and is very expensive. He stated mild relief of his pain.  He just saw his Cardiologist on Nov 30th.      Review of Systems   Review of Systems   Constitutional: Negative for chills, fever, malaise/fatigue and weight loss.   HENT: Negative for congestion, ear discharge, ear pain, hearing loss, sore throat and tinnitus.    Eyes: Negative for blurred vision, double vision, photophobia and pain.   Respiratory: Negative for cough, hemoptysis, sputum production and shortness of breath.    Cardiovascular: Negative for chest pain, palpitations, orthopnea, claudication and leg swelling.   Gastrointestinal: Negative for blood in stool, heartburn, nausea and vomiting.   Genitourinary: Negative for dysuria, frequency, hematuria and urgency.   Musculoskeletal: Positive for joint pain. Negative for back pain, myalgias and neck pain.   Skin: Negative for itching and rash.   Neurological: Negative for dizziness, tingling, speech change, focal weakness and headaches.   Endo/Heme/Allergies: Negative for environmental allergies. Does not bruise/bleed easily.   Psychiatric/Behavioral: Negative for depression, substance abuse and  suicidal ideas. The patient is not nervous/anxious.          Past Medical History:   Diagnosis Date   • A-fib (HCC) years    years   • Arthritis     hands   • Family history of hypertension in mother    • Family history of ischemic heart disease (IHD)    • Family history of pancreatic cancer     sister   • Goiter        Patient Active Problem List    Diagnosis Date Noted   • Amaurosis fugax 10/07/2021   • Nontoxic multinodular goiter 10/07/2021   • Generalized abdominal pain 10/07/2021   • Right hand pain 10/07/2021   • Disease due to severe acute respiratory syndrome coronavirus 2 (SARS-CoV-2) 02/11/2021   • Hearing loss, bilateral 08/27/2019   • Hyperlipidemia, unspecified 08/27/2019   • Carotid artery stenosis 08/26/2019   • Essential (primary) hypertension 08/26/2019   • Chronic atrial fibrillation (HCC) 06/22/2017   • Agatston CAC score, >400 06/22/2017   • Elevated fasting glucose 06/22/2017   • Vitamin D deficiency 06/22/2017   • Coronary atherosclerosis due to calcified coronary lesions of native arteries, severe, asymptomatic 06/22/2017   • Thoracic aorta atherosclerosis (HCC) 06/22/2017   • Calculus of gallbladder without cholecystitis without obstruction 06/22/2017   • Benign non-nodular prostatic hyperplasia with lower urinary tract symptoms 06/22/2017   • Family history of ischemic heart disease (IHD)    • Family history of hypertension in mother    • Family history of pancreatic cancer    • Carpal tunnel syndrome of right wrist 12/21/2015       Allergies:Patient has no known allergies.    Current Outpatient Medications   Medication Sig Dispense Refill   • atorvastatin (LIPITOR) 40 MG Tab Take 1 Tablet by mouth every day. 60 Tablet 3   • losartan (COZAAR) 25 MG Tab Take 1 Tablet by mouth every day. 60 Tablet 1   • diclofenac sodium (VOLTAREN) 1 % Gel Apply 2 g topically 4 times a day as needed. 2 g 2   • carvedilol (COREG) 3.125 MG Tab Take 3.125 mg by mouth 2 times a day, with meals.     • apixaban  "(ELIQUIS) 5mg Tab Take 5 mg by mouth 2 Times a Day.       No current facility-administered medications for this visit.       Social History     Tobacco Use   • Smoking status: Never Smoker   • Smokeless tobacco: Never Used   Substance Use Topics   • Alcohol use: Yes     Alcohol/week: 0.0 oz     Comment: rarely   • Drug use: No       Family History   Problem Relation Age of Onset   • Heart Attack Father 89   • Hypertension Mother    • Dementia Mother 80   • Cancer Sister 68        pancreatic   • Other Sister         Brain aneurysm           Exam:  /72   Pulse 84   Temp 37 °C (98.6 °F)   Ht 1.854 m (6' 1\")   Wt 87.5 kg (193 lb)   SpO2 96%  Body mass index is 25.46 kg/m².  Constitutional:  Not in acute distress, well appearing.  HEENT:   NC/AT  Cardiovascular: Regular rate and rhythm. No murmurs or gallops.      Lungs:   Clear to auscultation bilaterally. No wheezes or crackles. No respiratory distress.  Abdomen: Not distended, soft, not tender. No guarding or rigidity. No masses.  Extremities:  No cyanosis/clubbing/edema. Deformity and multiple joints right hand, nothing new, he has multiple injuries since younge age, no significant changes in recent years, but has had more pain recent months. No evidence of erythema or swelling.  Skin:  Warm and dry.  No visible rashes.  Neurologic: Alert & oriented x 3, CN II-XII grossly intact, strength and sensation grossly intact.  No focal deficits noted.  Psychiatric:  Affect normal, mood normal, judgment normal.      Assessment and Plan:   1. Essential (primary) hypertension  - Last visit BP elevated  - Started Losartan 25 mg daily  - Improved  - At home most times 120's, rarely 140's  - At office 112/72  - Patient asymptomatic  - He did not get BMP as order previous visit        PLAN:  - Low salt diet  - Monitor BP more often at least 3-4 times per week  - Regular exercise  - Get BMP    2. Right hand pain  - Just got Voltaren gel used few times no much " improvement  - No significant pain at office  - Deformities unchanged  - Take also tylenol BIB 2 tab and continue gel cream BID daily  - PT  - Follow up in 3 -4 months    - Referral to Physical Therapy    3. Generalized abdominal pain  - Improved  - NO recent pain  - Has not gotten CTA  - Order placed again    - CT-CTA ABDOMEN WITH & W/O-POST PROCESS; Future    4. Agatston CAC score, >400  - CAD  - Get CTA    - CT-CTA ABDOMEN WITH & W/O-POST PROCESS; Future      Return in about 4 months (around 4/9/2022).        Please note that this dictation was created using voice recognition software. I have made every reasonable attempt to correct obvious errors, but I expect that there are errors of grammar and possibly content that I did not discover before finalizing the note.

## 2021-12-11 ENCOUNTER — HOSPITAL ENCOUNTER (OUTPATIENT)
Dept: LAB | Facility: MEDICAL CENTER | Age: 76
End: 2021-12-11
Attending: STUDENT IN AN ORGANIZED HEALTH CARE EDUCATION/TRAINING PROGRAM
Payer: MEDICARE

## 2021-12-11 DIAGNOSIS — Z29.89 ENCOUNTER FOR HYDRATION PRIOR TO CT SCAN: ICD-10-CM

## 2021-12-11 LAB
BUN SERPL-MCNC: 18 MG/DL (ref 8–22)
CREAT SERPL-MCNC: 1.18 MG/DL (ref 0.5–1.4)

## 2021-12-11 PROCEDURE — 84520 ASSAY OF UREA NITROGEN: CPT

## 2021-12-11 PROCEDURE — 82565 ASSAY OF CREATININE: CPT

## 2021-12-11 PROCEDURE — 36415 COLL VENOUS BLD VENIPUNCTURE: CPT

## 2021-12-13 ENCOUNTER — HOSPITAL ENCOUNTER (OUTPATIENT)
Dept: RADIOLOGY | Facility: MEDICAL CENTER | Age: 76
End: 2021-12-13
Attending: STUDENT IN AN ORGANIZED HEALTH CARE EDUCATION/TRAINING PROGRAM
Payer: MEDICARE

## 2021-12-13 DIAGNOSIS — R10.84 GENERALIZED ABDOMINAL PAIN: ICD-10-CM

## 2021-12-13 DIAGNOSIS — R93.1 AGATSTON CAC SCORE, >400: ICD-10-CM

## 2021-12-13 PROCEDURE — 700117 HCHG RX CONTRAST REV CODE 255: Performed by: STUDENT IN AN ORGANIZED HEALTH CARE EDUCATION/TRAINING PROGRAM

## 2021-12-13 PROCEDURE — 74174 CTA ABD&PLVS W/CONTRAST: CPT | Mod: MG

## 2021-12-13 RX ADMIN — IOHEXOL 100 ML: 350 INJECTION, SOLUTION INTRAVENOUS at 13:35

## 2021-12-20 DIAGNOSIS — I10 ESSENTIAL (PRIMARY) HYPERTENSION: ICD-10-CM

## 2021-12-20 DIAGNOSIS — I25.10 CORONARY ARTERY DISEASE WITHOUT ANGINA PECTORIS, UNSPECIFIED VESSEL OR LESION TYPE, UNSPECIFIED WHETHER NATIVE OR TRANSPLANTED HEART: ICD-10-CM

## 2021-12-20 NOTE — TELEPHONE ENCOUNTER
Last seen: 12/09/21 by Dr. Ospina Next appt: 04/12/21      Does patient have an active prescription for medications requested? No    Received Request Via: Pharmacy

## 2021-12-21 RX ORDER — LOSARTAN POTASSIUM 25 MG/1
25 TABLET ORAL DAILY
Qty: 90 TABLET | Refills: 3 | Status: SHIPPED | OUTPATIENT
Start: 2021-12-21 | End: 2022-12-23

## 2021-12-21 RX ORDER — ATORVASTATIN CALCIUM 40 MG/1
40 TABLET, FILM COATED ORAL DAILY
Qty: 90 TABLET | Refills: 3 | Status: SHIPPED | OUTPATIENT
Start: 2021-12-21 | End: 2022-12-23

## 2022-05-26 ENCOUNTER — OFFICE VISIT (OUTPATIENT)
Dept: INTERNAL MEDICINE | Facility: OTHER | Age: 77
End: 2022-05-26
Payer: MEDICARE

## 2022-05-26 VITALS
HEIGHT: 73 IN | HEART RATE: 74 BPM | WEIGHT: 190.6 LBS | TEMPERATURE: 97.6 F | OXYGEN SATURATION: 97 % | BODY MASS INDEX: 25.26 KG/M2 | DIASTOLIC BLOOD PRESSURE: 76 MMHG | SYSTOLIC BLOOD PRESSURE: 111 MMHG

## 2022-05-26 DIAGNOSIS — I48.20 CHRONIC ATRIAL FIBRILLATION (HCC): ICD-10-CM

## 2022-05-26 DIAGNOSIS — R10.30 LOWER ABDOMINAL PAIN: ICD-10-CM

## 2022-05-26 DIAGNOSIS — K59.09 CHRONIC CONSTIPATION: ICD-10-CM

## 2022-05-26 DIAGNOSIS — E78.5 HYPERLIPIDEMIA, UNSPECIFIED HYPERLIPIDEMIA TYPE: ICD-10-CM

## 2022-05-26 DIAGNOSIS — I10 ESSENTIAL (PRIMARY) HYPERTENSION: ICD-10-CM

## 2022-05-26 PROBLEM — I67.9 CEREBROVASCULAR DISEASE: Status: ACTIVE | Noted: 2021-11-30

## 2022-05-26 PROBLEM — U07.1 DISEASE DUE TO SEVERE ACUTE RESPIRATORY SYNDROME CORONAVIRUS 2 (SARS-COV-2): Status: RESOLVED | Noted: 2021-02-11 | Resolved: 2022-05-26

## 2022-05-26 PROCEDURE — 99214 OFFICE O/P EST MOD 30 MIN: CPT | Performed by: INTERNAL MEDICINE

## 2022-05-26 ASSESSMENT — PATIENT HEALTH QUESTIONNAIRE - PHQ9: CLINICAL INTERPRETATION OF PHQ2 SCORE: 0

## 2022-05-26 NOTE — PROGRESS NOTES
Established Patient    Patient Care Team:  Carlo Peters M.D. as PCP - General    Sharan Noe is a 77 y.o. male who presents today with the following Chief Complaint(s): Follow up for Diagnoses of Lower abdominal pain, Chronic constipation, Essential (primary) hypertension, Chronic atrial fibrillation (HCC), and Hyperlipidemia, unspecified hyperlipidemia type were pertinent to this visit.    HPI:  1. Lower abdominal pain  2. Chronic constipation  Sharan comes in today primarily for follow-up for ongoing abdominal pain.  He was here back in the winter 2021 complaining of similar symptoms including postprandial pain.  Given concern about potential mesenteric ischemia, especially with his significant vasculopathy, he underwent a CTA of the abdomen on December 13, 2021.  Results were unrevealing for any specific cause of abdominal pain such as mesenteric ischemia however did note the presence of gallstones.   IMPRESSION:     1.  No aortic aneurysm or dissection.  2.  Atherosclerosis including extensive coronary artery calcifications.  3.  Proximal bilateral renal artery calcified plaque likely less than 50% stenosis.  4.  Right renal cortical scarring.  5.  No acute inflammatory abnormality within the abdomen or pelvis is seen.  6.  Cholelithiasis.    Since then, his abdominal discomfort has continued but has decreased in frequency.  Now he is getting it about 3-4 times a week.  He describes it in his mid to low abdomen.  He notes it mostly after eating, typically coming on fairly immediately upon eating and lasting 20 to 30 minutes before slowly dissipating.  He describes no right upper quadrant tenderness.  No association between abdominal pain and certain types of food.  He does note however that he has been having difficulty with constipation for the entire time he has been having difficulty with his pain.  He also notes that during times in which he may go 1 to 2 days without a bowel movement, he  seems to have more frequent lower abdominal symptoms.  He has not done any over-the-counter treatments for constipation, but has been advised to increase his water intake.  He notes no nausea or vomiting.  Otherwise he is feeling well.    3. Essential (primary) hypertension  Blood pressure stable well-controlled in the office today at 111/76.  He has had no recent changes to his blood pressure regimen.    4. Chronic atrial fibrillation (HCC)  Overall doing well.  He remains on apixaban 5 mg twice daily.  He has no significant abnormal bleeding at this time.    5. Hyperlipidemia, unspecified hyperlipidemia type  Sharan is tolerating medication well.  No intolerable side-effects noted.  No new symptoms of muscle aches or weakness.  Patient reports good adherence to medication regimen.  No change in medication since the last visit. Sharan is trying to follow a low-cholesterol diet.  Exercise is adequate.     ROS:     Denies any new chest pain or shortness of breath.  No changes to urinary or bowel function.  See HPI.    Past Medical History:   Diagnosis Date   • A-fib (HCC) years    years   • Amaurosis fugax 10/7/2021   • Arthritis     hands   • Benign non-nodular prostatic hyperplasia with lower urinary tract symptoms 6/22/2017   • Calculus of gallbladder without cholecystitis without obstruction 6/22/2017   • Carotid artery stenosis 8/26/2019   • Carpal tunnel syndrome of right wrist 12/21/2015   • Cerebrovascular disease 11/30/2021   • Chronic atrial fibrillation (HCC) 6/22/2017   • Coronary atherosclerosis due to calcified coronary lesions of native arteries, severe, asymptomatic 6/22/2017   • Disease due to severe acute respiratory syndrome coronavirus 2 (SARS-CoV-2) 2/11/2021   • Essential (primary) hypertension 8/26/2019   • Family history of hypertension in mother    • Family history of ischemic heart disease (IHD)    • Family history of ischemic heart disease (IHD)     IMO load March 2020   • Family history of  "pancreatic cancer     sister   • Goiter    • Thoracic aorta atherosclerosis (HCC) 6/22/2017   • Vitamin D deficiency 6/22/2017     Social History     Tobacco Use   • Smoking status: Never Smoker   • Smokeless tobacco: Never Used   Vaping Use   • Vaping Use: Never used   Substance Use Topics   • Alcohol use: Yes     Alcohol/week: 0.0 oz     Comment: rarely   • Drug use: No     Current Outpatient Medications   Medication Sig Dispense Refill   • atorvastatin (LIPITOR) 40 MG Tab Take 1 Tablet by mouth every day. 90 Tablet 3   • losartan (COZAAR) 25 MG Tab Take 1 Tablet by mouth every day. 90 Tablet 3   • diclofenac sodium (VOLTAREN) 1 % Gel Apply 2 g topically 4 times a day as needed. 2 g 2   • carvedilol (COREG) 3.125 MG Tab Take 3.125 mg by mouth 2 times a day, with meals.     • apixaban (ELIQUIS) 5mg Tab Take 5 mg by mouth 2 Times a Day.       No current facility-administered medications for this visit.     Physical Exam:  /76 (BP Location: Left arm, Patient Position: Sitting, BP Cuff Size: Adult)   Pulse 74   Temp 36.4 °C (97.6 °F) (Temporal)   Ht 1.842 m (6' 0.5\")   Wt 86.5 kg (190 lb 9.6 oz)   SpO2 97%   BMI 25.49 kg/m²   General: Well developed, well nourished male, in no distress.  Eyes: Conjuntiva without any obvious injection or erythema.   Cardiovascular: Heart is regular with no murmur  Lungs: Clear to auscultation bilaterally. No wheezes, rhonci or crackles heard. Respiratory effort is normal.  Abd: Soft, non-tender, but he does describe some mild discomfort with moderate to deep palpation of his lower abdomen.  No right upper quadrant tenderness/Hall sign is noted.  Ext: No edema    Assessment and Plan:   1. Lower abdominal pain  2. Chronic constipation  At this point there is some comfort to be added by a relatively unremarkable CT scan.  He has cholelithiasis however his symptoms are inconsistent with this being be source.  I have some concerns that his changes in bowel movement, and in " particular his chronic constipation at this point, are contributing to his abdominal discomfort.  Before moving further I would like him to get on a good bowel regimen and get back to 1-2 bowel movements per day and then see what symptoms persist.  He will be due for a colonoscopy within the next 6 months, and if his symptoms do not resolve, I would anticipate having him proceed with colonoscopy earlier than originally scheduled.  With regard to his bowel regimen I recommended that he start with increasing his fiber intake with a fiber supplement, and if this is unsuccessful in completely mitigating his constipation issues, he can start a regimen of MiraLAX as well.    3. Essential (primary) hypertension  Blood pressure stable and well-controlled.  No changes to medications made today.    4. Chronic atrial fibrillation (HCC)  Continue current use of apixaban.    5. Hyperlipidemia, unspecified hyperlipidemia type  Currently this is stable and well controlled.  The patient should continue current therapy with no changes at this time.       Return in about 3 months (around 8/26/2022).  Patient Instructions   Try adding a fiber supplement like Citrucel, Metamucil or Benefiber to see if this increases your BM frequency.  The goal is to have 1-2 BMs per day.  If the fiber supplement doesn't get you to that goal, then consider adding OTC Miralax.  Be in touch with how well this works/doesn't work - if you are not better, you'll need a referral to GI for a colonoscopy.      He has cholelithiasis    Carlo Peters M.D.   of Internal Medicine  [facility]    This note was created using voice recognition software.  While every attempt is made to ensure accuracy of transcription, occasionally errors occur.

## 2022-05-26 NOTE — PATIENT INSTRUCTIONS
Try adding a fiber supplement like Citrucel, Metamucil or Benefiber to see if this increases your BM frequency.  The goal is to have 1-2 BMs per day.  If the fiber supplement doesn't get you to that goal, then consider adding OTC Miralax.  Be in touch with how well this works/doesn't work - if you are not better, you'll need a referral to GI for a colonoscopy.

## 2022-09-01 ENCOUNTER — OFFICE VISIT (OUTPATIENT)
Dept: INTERNAL MEDICINE | Facility: OTHER | Age: 77
End: 2022-09-01
Payer: MEDICARE

## 2022-09-01 VITALS
SYSTOLIC BLOOD PRESSURE: 130 MMHG | BODY MASS INDEX: 26.14 KG/M2 | TEMPERATURE: 97.2 F | DIASTOLIC BLOOD PRESSURE: 85 MMHG | HEART RATE: 70 BPM | WEIGHT: 197.2 LBS | OXYGEN SATURATION: 96 % | HEIGHT: 73 IN

## 2022-09-01 DIAGNOSIS — R10.30 LOWER ABDOMINAL PAIN: ICD-10-CM

## 2022-09-01 DIAGNOSIS — M54.50 CHRONIC BILATERAL LOW BACK PAIN, UNSPECIFIED WHETHER SCIATICA PRESENT: ICD-10-CM

## 2022-09-01 DIAGNOSIS — Z12.5 SCREENING FOR PROSTATE CANCER: ICD-10-CM

## 2022-09-01 DIAGNOSIS — K59.09 CHRONIC CONSTIPATION: ICD-10-CM

## 2022-09-01 DIAGNOSIS — Z11.59 NEED FOR HEPATITIS C SCREENING TEST: ICD-10-CM

## 2022-09-01 DIAGNOSIS — E78.5 HYPERLIPIDEMIA, UNSPECIFIED HYPERLIPIDEMIA TYPE: ICD-10-CM

## 2022-09-01 DIAGNOSIS — Z23 NEED FOR TDAP VACCINATION: ICD-10-CM

## 2022-09-01 DIAGNOSIS — Z78.9 HEPATITIS B VACCINATION STATUS UNKNOWN: ICD-10-CM

## 2022-09-01 DIAGNOSIS — G89.29 CHRONIC BILATERAL LOW BACK PAIN, UNSPECIFIED WHETHER SCIATICA PRESENT: ICD-10-CM

## 2022-09-01 DIAGNOSIS — I10 ESSENTIAL (PRIMARY) HYPERTENSION: ICD-10-CM

## 2022-09-01 DIAGNOSIS — D12.6 ADENOMATOUS POLYP OF COLON, UNSPECIFIED PART OF COLON: ICD-10-CM

## 2022-09-01 DIAGNOSIS — R73.01 ELEVATED FASTING GLUCOSE: ICD-10-CM

## 2022-09-01 DIAGNOSIS — Z23 NEED FOR STREPTOCOCCUS PNEUMONIAE AND INFLUENZA VACCINATION: ICD-10-CM

## 2022-09-01 PROBLEM — M79.641 RIGHT HAND PAIN: Chronic | Status: RESOLVED | Noted: 2021-10-07 | Resolved: 2022-09-01

## 2022-09-01 PROCEDURE — 90677 PCV20 VACCINE IM: CPT | Performed by: INTERNAL MEDICINE

## 2022-09-01 PROCEDURE — 99214 OFFICE O/P EST MOD 30 MIN: CPT | Mod: 25 | Performed by: INTERNAL MEDICINE

## 2022-09-01 PROCEDURE — 90472 IMMUNIZATION ADMIN EACH ADD: CPT | Performed by: INTERNAL MEDICINE

## 2022-09-01 PROCEDURE — 90715 TDAP VACCINE 7 YRS/> IM: CPT | Performed by: INTERNAL MEDICINE

## 2022-09-01 PROCEDURE — G0009 ADMIN PNEUMOCOCCAL VACCINE: HCPCS | Performed by: INTERNAL MEDICINE

## 2022-09-01 RX ORDER — POLYETHYLENE GLYCOL 3350 17 G/17G
POWDER, FOR SOLUTION ORAL
COMMUNITY
End: 2022-09-01

## 2022-09-01 RX ORDER — ATORVASTATIN CALCIUM 10 MG/1
TABLET, FILM COATED ORAL
COMMUNITY
End: 2022-12-23

## 2022-09-01 RX ORDER — AMOXICILLIN 500 MG/1
CAPSULE ORAL
COMMUNITY
End: 2022-09-01

## 2022-09-01 NOTE — PATIENT INSTRUCTIONS
Get a flu shot this fall.    Consider getting the Shingrix shingles vaccine.  This is a two vaccine series, the second vaccine is done approximately 2-6 months after the first.  This is done through your local pharmacy.     Check your blood pressure at home 2-3 times per week.  Record those readings and bring them with you to your next appointment.    When you check it, do it seated with your feet on the floor, your back supported and your arm resting at heart level (such as on the kitchen table).  Check it three times,  by approximately 1 minute between each reading.  Take the average of the three readings and record that as your blood pressure for the day.     Get blood work before your next visit.    Schedule a colonoscopy.    Add a fiber supplement such as metamucil, benefiber or Citrucel to your daily regimen with the goal of increasing the frequency of your bowel movements.

## 2022-09-01 NOTE — PROGRESS NOTES
"    Established Patient    Patient Care Team:  Carlo Peters M.D. as PCP - General    Sharan Noe is a 77 y.o. male who presents today with the following Chief Complaint(s): Follow up for Diagnoses of Chronic constipation, Lower abdominal pain, Adenomatous polyp of colon, unspecified part of colon, Essential (primary) hypertension, Hyperlipidemia, unspecified hyperlipidemia type, Chronic bilateral low back pain, unspecified whether sciatica present, Elevated fasting glucose, Need for Tdap vaccination, Need for Streptococcus pneumoniae and influenza vaccination, Need for hepatitis C screening test, Hepatitis B vaccination status unknown, and Screening for prostate cancer were pertinent to this visit.    HPI:  1. Chronic constipation  2. Lower abdominal pain  3. Adenomatous polyp of colon, unspecified part of colon  At his last visit we discussed his lower abdominal pain and chronic constipation, and there appeared to be a significant relationship between his constipation and his abdominal pain.  He would find that postprandially he would get lower abdominal pain that would be relieved with a bowel movement.  Bowel movements have been relatively normal except for the frequency.  I recommended he tried some MiraLAX which she did but unfortunately found that while it helped his bowel movement frequency he describes his bowel movements afterwards as \"sticky\" which was intolerable for him.  Since then he has gone back to just his usual regimen which is some cereal in the morning.  Of note, he is just a few months shy of being due for his follow-up colonoscopy from his history of adenomatous colon polyps.    4. Essential (primary) hypertension  Blood pressure is not being checked at home, but today in the office is reasonable at 130/85.  He said no changes to his medications.    5. Hyperlipidemia, unspecified hyperlipidemia type  Sharan is tolerating medication well.  No intolerable side-effects noted.  No new " symptoms of muscle aches or weakness.  Patient reports good adherence to medication regimen.  No change in medication since the last visit. Sharan is trying to follow a low-cholesterol diet.  Exercise is adequate.     6. Chronic bilateral low back pain, unspecified whether sciatica present  He continues to do therapy, and has been seeing some specialist for his chronic back pain, and they are considering doing a nerve ablation therapy.    7. Elevated fasting glucose  History of elevated fasting glucose, which needs a follow-up lab in the near future.    8. Need for Tdap vaccination    9. Need for Streptococcus pneumoniae and influenza vaccination    10. Need for hepatitis C screening test    11. Hepatitis B vaccination status unknown    12. Screening for prostate cancer  Patient has had PSAs done before, but its been a few years and he is interested in proceeding with another PSA test.    ROS:     Denies any new chest pain or shortness of breath.  No changes to urinary or bowel function.  See HPI.    Past Medical History:   Diagnosis Date    A-fib (HCC) years    years    Amaurosis fugax 10/7/2021    Arthritis     hands    Benign non-nodular prostatic hyperplasia with lower urinary tract symptoms 6/22/2017    Calculus of gallbladder without cholecystitis without obstruction 6/22/2017    Carotid artery stenosis 8/26/2019    Carpal tunnel syndrome of right wrist 12/21/2015    Cerebrovascular disease 11/30/2021    Chronic atrial fibrillation (HCC) 6/22/2017    Coronary atherosclerosis due to calcified coronary lesions of native arteries, severe, asymptomatic 6/22/2017    Disease due to severe acute respiratory syndrome coronavirus 2 (SARS-CoV-2) 2/11/2021    Essential (primary) hypertension 8/26/2019    Family history of hypertension in mother     Family history of ischemic heart disease (IHD)     Family history of ischemic heart disease (IHD)     IMO load March 2020    Family history of pancreatic cancer     sister     "Goiter     Thoracic aorta atherosclerosis (HCC) 6/22/2017    Vitamin D deficiency 6/22/2017     Social History     Tobacco Use    Smoking status: Never    Smokeless tobacco: Never   Vaping Use    Vaping Use: Never used   Substance Use Topics    Alcohol use: Yes     Alcohol/week: 0.0 oz     Comment: rarely    Drug use: No     Current Outpatient Medications   Medication Sig Dispense Refill    atorvastatin (LIPITOR) 10 MG Tab atorvastatin 10 mg tablet   Take 1 tablet every day by oral route.      atorvastatin (LIPITOR) 40 MG Tab Take 1 Tablet by mouth every day. 90 Tablet 3    losartan (COZAAR) 25 MG Tab Take 1 Tablet by mouth every day. 90 Tablet 3    carvedilol (COREG) 3.125 MG Tab Take 3.125 mg by mouth 2 times a day, with meals.      apixaban (ELIQUIS) 5mg Tab Take 5 mg by mouth 2 Times a Day.       No current facility-administered medications for this visit.     Physical Exam:  /85 (BP Location: Right arm, Patient Position: Sitting, BP Cuff Size: Adult)   Pulse 70   Temp 36.2 °C (97.2 °F) (Temporal)   Ht 1.854 m (6' 1\")   Wt 89.4 kg (197 lb 3.2 oz)   SpO2 96%   BMI 26.02 kg/m²   General: Well developed, well nourished male, in no distress.  Eyes: Conjuntiva without any obvious injection or erythema.   Cardiovascular: Heart is regular with no murmur  Lungs: Clear to auscultation bilaterally. No wheezes, rhonci or crackles heard. Respiratory effort is normal.  Abd: Soft, non-tender  Ext: No edema    Assessment and Plan:   1. Chronic constipation  2. Lower abdominal pain  3. Adenomatous polyp of colon, unspecified part of colon  His lower abdominal pain seems to be very much be related to diet, and likely gastrocolic reflex in the setting of chronic constipation.  I would like him to expand his fiber, using some over-the-counter fiber supplements such as Metamucil or Benefiber, and he is open to this.  Additionally, it would be good to rule out any structural abnormalities given his history of adenomatous " polyps, so I have referred him back to gastroenterology for an early colonoscopy.  - Referral to GI for Colonoscopy    4. Essential (primary) hypertension  Overall stable, I will plan to check labs but recommend continuing current medications.  I would like him to do home blood pressure readings however he has not been consistent with this in the past.  - CBC WITHOUT DIFFERENTIAL; Future  - Comp Metabolic Panel; Future    5. Hyperlipidemia, unspecified hyperlipidemia type  Currently this is stable and well controlled.  The patient should continue current therapy with no changes at this time.   Continue atorvastatin 40 mg daily  - Comp Metabolic Panel; Future  - Lipid Profile; Future    6. Chronic bilateral low back pain, unspecified whether sciatica present  Uncomfortable with him proceeding with a nerve ablation as per recommendations.    7. Elevated fasting glucose  We will plan to follow-up with a metabolic panel and hemoglobin A1c prior to his next visit  - Comp Metabolic Panel; Future  - HEMOGLOBIN A1C; Future    8. Need for Tdap vaccination  - Tdap =>8yo IM    9. Need for Streptococcus pneumoniae and influenza vaccination  - Pneumococcal Conjugate Vaccine 20-Valent (19 yrs+)    10. Need for hepatitis C screening test  - HEP C VIRUS ANTIBODY    11. Hepatitis B vaccination status unknown  - HEP B SURFACE AB    12. Screening for prostate cancer  We discussed the pros and cons of prostate cancer and he would like to proceed so I ordered a PSA test to be done with his next labs.  - PROSTATE SPECIFIC AG SCREENING; Future     Return in about 3 months (around 12/1/2022).  Patient Instructions   Get a flu shot this fall.    Consider getting the Shingrix shingles vaccine.  This is a two vaccine series, the second vaccine is done approximately 2-6 months after the first.  This is done through your local pharmacy.     Check your blood pressure at home 2-3 times per week.  Record those readings and bring them with you to  your next appointment.    When you check it, do it seated with your feet on the floor, your back supported and your arm resting at heart level (such as on the kitchen table).  Check it three times,  by approximately 1 minute between each reading.  Take the average of the three readings and record that as your blood pressure for the day.     Get blood work before your next visit.    Schedule a colonoscopy.    Add a fiber supplement such as metamucil, benefiber or Citrucel to your daily regimen with the goal of increasing the frequency of your bowel movements.      Carlo Peters M.D.   of Internal Medicine  [facility]    This note was created using voice recognition software.  While every attempt is made to ensure accuracy of transcription, occasionally errors occur.

## 2022-11-10 ENCOUNTER — PATIENT MESSAGE (OUTPATIENT)
Dept: HEALTH INFORMATION MANAGEMENT | Facility: OTHER | Age: 77
End: 2022-11-10

## 2022-12-23 DIAGNOSIS — I10 ESSENTIAL (PRIMARY) HYPERTENSION: ICD-10-CM

## 2022-12-23 DIAGNOSIS — I25.10 CORONARY ARTERY DISEASE WITHOUT ANGINA PECTORIS, UNSPECIFIED VESSEL OR LESION TYPE, UNSPECIFIED WHETHER NATIVE OR TRANSPLANTED HEART: ICD-10-CM

## 2022-12-23 RX ORDER — ATORVASTATIN CALCIUM 40 MG/1
40 TABLET, FILM COATED ORAL DAILY
Qty: 90 TABLET | Refills: 3 | Status: SHIPPED | OUTPATIENT
Start: 2022-12-23 | End: 2023-12-28

## 2022-12-23 RX ORDER — LOSARTAN POTASSIUM 25 MG/1
25 TABLET ORAL DAILY
Qty: 90 TABLET | Refills: 3 | Status: SHIPPED | OUTPATIENT
Start: 2022-12-23 | End: 2023-12-28

## 2022-12-23 NOTE — TELEPHONE ENCOUNTER
Received request via: Pharmacy    Was the patient seen in the last year in this department? Yes    Does the patient have an active prescription (recently filled or refills available) for medication(s) requested? No    Does the patient have FDC Plus and need 100 day supply (blood pressure, diabetes and cholesterol meds only)? Patient does not have SCP

## 2023-01-04 ENCOUNTER — OFFICE VISIT (OUTPATIENT)
Dept: INTERNAL MEDICINE | Facility: OTHER | Age: 78
End: 2023-01-04
Payer: MEDICARE

## 2023-01-04 VITALS
WEIGHT: 189.2 LBS | HEIGHT: 73 IN | SYSTOLIC BLOOD PRESSURE: 113 MMHG | HEART RATE: 60 BPM | TEMPERATURE: 97.9 F | BODY MASS INDEX: 25.08 KG/M2 | DIASTOLIC BLOOD PRESSURE: 73 MMHG | OXYGEN SATURATION: 97 %

## 2023-01-04 DIAGNOSIS — I48.20 CHRONIC ATRIAL FIBRILLATION (HCC): ICD-10-CM

## 2023-01-04 DIAGNOSIS — I10 ESSENTIAL (PRIMARY) HYPERTENSION: ICD-10-CM

## 2023-01-04 DIAGNOSIS — M54.50 CHRONIC BILATERAL LOW BACK PAIN, UNSPECIFIED WHETHER SCIATICA PRESENT: ICD-10-CM

## 2023-01-04 DIAGNOSIS — G89.29 CHRONIC BILATERAL LOW BACK PAIN, UNSPECIFIED WHETHER SCIATICA PRESENT: ICD-10-CM

## 2023-01-04 DIAGNOSIS — E78.5 HYPERLIPIDEMIA, UNSPECIFIED HYPERLIPIDEMIA TYPE: ICD-10-CM

## 2023-01-04 DIAGNOSIS — Z23 NEED FOR INFLUENZA VACCINATION: ICD-10-CM

## 2023-01-04 PROCEDURE — G0008 ADMIN INFLUENZA VIRUS VAC: HCPCS | Performed by: INTERNAL MEDICINE

## 2023-01-04 PROCEDURE — 90662 IIV NO PRSV INCREASED AG IM: CPT | Performed by: INTERNAL MEDICINE

## 2023-01-04 PROCEDURE — 99214 OFFICE O/P EST MOD 30 MIN: CPT | Mod: 25 | Performed by: INTERNAL MEDICINE

## 2023-01-04 ASSESSMENT — PATIENT HEALTH QUESTIONNAIRE - PHQ9: CLINICAL INTERPRETATION OF PHQ2 SCORE: 0

## 2023-01-04 NOTE — PATIENT INSTRUCTIONS
Get your labs done soon.    I strongly recommend that you get an updated COVID booster.  This is available to you at your local pharmacy.

## 2023-01-10 NOTE — PROGRESS NOTES
Established Patient    Patient Care Team:  Carlo Peters M.D. as PCP - General    Sharan Noe is a 77 y.o. male who presents today with the following Chief Complaint(s): Follow up for Diagnoses of Chronic atrial fibrillation (HCC), Chronic bilateral low back pain, unspecified whether sciatica present, Essential (primary) hypertension, Hyperlipidemia, unspecified hyperlipidemia type, and Need for influenza vaccination were pertinent to this visit.    HPI:  1. Chronic atrial fibrillation (HCC)  Generally doing well.  He is on Eliquis 5 mg twice daily and tolerating it without any difficulty.  No abnormal bleeding noted.    2. Chronic bilateral low back pain, unspecified whether sciatica present  He notes that back in November he had an epidural which she did not find to be very helpful.  He continues to follow-up.    3. Essential (primary) hypertension  Blood pressure stable and well-controlled on his current medications.    4. Hyperlipidemia, unspecified hyperlipidemia type  Sharan is tolerating medication well.  No intolerable side-effects noted.  No new symptoms of muscle aches or weakness.  Patient reports good adherence to medication regimen.  No change in medication since the last visit. Sharan is trying to follow a low-cholesterol diet.  Exercise is adequate.  He is on atorvastatin 40 mg daily    5. Need for influenza vaccination    ROS:     Denies any new chest pain or shortness of breath.  No changes to urinary or bowel function.  See HPI.    Past Medical History:   Diagnosis Date    A-fib (HCC) years    years    Amaurosis fugax 10/7/2021    Arthritis     hands    Benign non-nodular prostatic hyperplasia with lower urinary tract symptoms 6/22/2017    Calculus of gallbladder without cholecystitis without obstruction 6/22/2017    Carotid artery stenosis 8/26/2019    Carpal tunnel syndrome of right wrist 12/21/2015    Cerebrovascular disease 11/30/2021    Chronic atrial fibrillation (HCC) 6/22/2017  "   Coronary atherosclerosis due to calcified coronary lesions of native arteries, severe, asymptomatic 6/22/2017    Disease due to severe acute respiratory syndrome coronavirus 2 (SARS-CoV-2) 2/11/2021    Essential (primary) hypertension 8/26/2019    Family history of hypertension in mother     Family history of ischemic heart disease (IHD)     Family history of ischemic heart disease (IHD)     IMO load March 2020    Family history of pancreatic cancer     sister    Goiter     Thoracic aorta atherosclerosis (HCC) 6/22/2017    Vitamin D deficiency 6/22/2017     Social History     Tobacco Use    Smoking status: Never    Smokeless tobacco: Never   Vaping Use    Vaping Use: Never used   Substance Use Topics    Alcohol use: Yes     Alcohol/week: 0.0 oz     Comment: rarely    Drug use: No     Current Outpatient Medications   Medication Sig Dispense Refill    losartan (COZAAR) 25 MG Tab TAKE 1 TABLET BY MOUTH EVERY DAY 90 Tablet 3    atorvastatin (LIPITOR) 40 MG Tab TAKE 1 TABLET BY MOUTH EVERY DAY 90 Tablet 3    carvedilol (COREG) 3.125 MG Tab Take 3.125 mg by mouth 2 times a day, with meals.      apixaban (ELIQUIS) 5mg Tab Take 5 mg by mouth 2 Times a Day.       No current facility-administered medications for this visit.     Physical Exam:  /73 (BP Location: Left arm, Patient Position: Sitting, BP Cuff Size: Adult)   Pulse 60   Temp 36.6 °C (97.9 °F) (Temporal)   Ht 1.854 m (6' 1\")   Wt 85.8 kg (189 lb 3.2 oz)   SpO2 97%   BMI 24.96 kg/m²   General: Well developed, well nourished male, in no distress.  Eyes: Conjuntiva without any obvious injection or erythema.   Cardiovascular: Heart is regular with no murmur  Lungs: Clear to auscultation bilaterally. No wheezes, rhonci or crackles heard. Respiratory effort is normal.  Abd: Soft, non-tender  Ext: No edema    Assessment and Plan:   1. Chronic atrial fibrillation (HCC)  Currently this is stable and well controlled.  The patient should continue current therapy " with no changes at this time.   Continue Eliquis.  Continue carvedilol.    2. Chronic bilateral low back pain, unspecified whether sciatica present  Continue follow-up with spine specialists    3. Essential (primary) hypertension  Currently this is stable and well controlled.  The patient should continue current therapy with no changes at this time.       4. Hyperlipidemia, unspecified hyperlipidemia type  Currently this is stable and well controlled.  The patient should continue current therapy with no changes at this time.       5. Need for influenza vaccination  - INFLUENZA VACCINE, HIGH DOSE (65+ ONLY)     Return in about 6 months (around 7/4/2023).  Patient Instructions   Get your labs done soon.    I strongly recommend that you get an updated COVID booster.  This is available to you at your local pharmacy.        Carlo Peters M.D.   of Internal Medicine  [facility]    This note was created using voice recognition software.  While every attempt is made to ensure accuracy of transcription, occasionally errors occur.

## 2023-01-12 ENCOUNTER — HOSPITAL ENCOUNTER (OUTPATIENT)
Dept: RADIOLOGY | Facility: MEDICAL CENTER | Age: 78
End: 2023-01-12
Attending: NURSE PRACTITIONER
Payer: MEDICARE

## 2023-01-12 DIAGNOSIS — M48.061 SPINAL STENOSIS, LUMBAR REGION, WITHOUT NEUROGENIC CLAUDICATION: ICD-10-CM

## 2023-01-12 PROCEDURE — 72131 CT LUMBAR SPINE W/O DYE: CPT

## 2023-09-05 ENCOUNTER — OFFICE VISIT (OUTPATIENT)
Dept: INTERNAL MEDICINE | Facility: OTHER | Age: 78
End: 2023-09-05
Payer: MEDICARE

## 2023-09-05 VITALS
DIASTOLIC BLOOD PRESSURE: 90 MMHG | BODY MASS INDEX: 25.18 KG/M2 | HEART RATE: 76 BPM | WEIGHT: 190 LBS | HEIGHT: 73 IN | SYSTOLIC BLOOD PRESSURE: 149 MMHG | TEMPERATURE: 98 F | OXYGEN SATURATION: 96 %

## 2023-09-05 DIAGNOSIS — H91.93 BILATERAL HEARING LOSS, UNSPECIFIED HEARING LOSS TYPE: ICD-10-CM

## 2023-09-05 DIAGNOSIS — I25.84 CORONARY ATHEROSCLEROSIS DUE TO CALCIFIED CORONARY LESION OF NATIVE ARTERY: ICD-10-CM

## 2023-09-05 DIAGNOSIS — R41.3 MEMORY LOSS: ICD-10-CM

## 2023-09-05 DIAGNOSIS — G89.29 CHRONIC BILATERAL LOW BACK PAIN, UNSPECIFIED WHETHER SCIATICA PRESENT: ICD-10-CM

## 2023-09-05 DIAGNOSIS — I48.20 CHRONIC ATRIAL FIBRILLATION (HCC): ICD-10-CM

## 2023-09-05 DIAGNOSIS — E78.5 HYPERLIPIDEMIA, UNSPECIFIED HYPERLIPIDEMIA TYPE: ICD-10-CM

## 2023-09-05 DIAGNOSIS — M54.50 CHRONIC BILATERAL LOW BACK PAIN, UNSPECIFIED WHETHER SCIATICA PRESENT: ICD-10-CM

## 2023-09-05 DIAGNOSIS — I25.10 CORONARY ATHEROSCLEROSIS DUE TO CALCIFIED CORONARY LESION OF NATIVE ARTERY: ICD-10-CM

## 2023-09-05 DIAGNOSIS — I10 ESSENTIAL (PRIMARY) HYPERTENSION: ICD-10-CM

## 2023-09-05 PROCEDURE — 99214 OFFICE O/P EST MOD 30 MIN: CPT | Performed by: INTERNAL MEDICINE

## 2023-09-05 PROCEDURE — 3080F DIAST BP >= 90 MM HG: CPT | Performed by: INTERNAL MEDICINE

## 2023-09-05 PROCEDURE — 3077F SYST BP >= 140 MM HG: CPT | Performed by: INTERNAL MEDICINE

## 2023-09-05 NOTE — PATIENT INSTRUCTIONS
Get a flu shot this fall.  Look out for new COVID vaccine recommendations coming out in the next couple of weeks.    Check your blood pressure at home 3-4 times per week.  Record those readings and bring them with you to your next appointment.    When you check it, do it seated with your feet on the floor, your back supported and your arm resting at heart level (such as on the kitchen table).  Check it three times,  by approximately 1 minute between each reading.  Take the average of the three readings and record that as your blood pressure for the day.

## 2023-09-07 NOTE — PROGRESS NOTES
Established Patient    Patient Care Team:  Carlo Peters M.D. as PCP - General    Sharan Noe is a 78 y.o. male who presents today with the following Chief Complaint(s): Follow up for Diagnoses of Chronic bilateral low back pain, unspecified whether sciatica present, Chronic atrial fibrillation (HCC), Essential (primary) hypertension, Hyperlipidemia, unspecified hyperlipidemia type, Coronary atherosclerosis due to calcified coronary lesions of native arteries, severe, asymptomatic, Memory loss, and Bilateral hearing loss, unspecified hearing loss type were pertinent to this visit.    ROS:     Denies any new chest pain or shortness of breath.  No changes to urinary or bowel function.   See HPI.    Past Medical History:   Diagnosis Date    A-fib (HCC) years    years    Amaurosis fugax 10/7/2021    Arthritis     hands    Benign non-nodular prostatic hyperplasia with lower urinary tract symptoms 6/22/2017    Calculus of gallbladder without cholecystitis without obstruction 6/22/2017    Carotid artery stenosis 8/26/2019    Carpal tunnel syndrome of right wrist 12/21/2015    Cerebrovascular disease 11/30/2021    Chronic atrial fibrillation (HCC) 6/22/2017    Coronary atherosclerosis due to calcified coronary lesions of native arteries, severe, asymptomatic 6/22/2017    Disease due to severe acute respiratory syndrome coronavirus 2 (SARS-CoV-2) 2/11/2021    Essential (primary) hypertension 8/26/2019    Family history of hypertension in mother     Family history of ischemic heart disease (IHD)     Family history of ischemic heart disease (IHD)     IMO load March 2020    Family history of pancreatic cancer     sister    Goiter     Thoracic aorta atherosclerosis (HCC) 6/22/2017    Vitamin D deficiency 6/22/2017     Social History     Tobacco Use    Smoking status: Never    Smokeless tobacco: Never   Vaping Use    Vaping Use: Never used   Substance Use Topics    Alcohol use: Yes     Alcohol/week: 0.0 oz      "Comment: rarely    Drug use: No     Current Outpatient Medications   Medication Sig Dispense Refill    losartan (COZAAR) 25 MG Tab TAKE 1 TABLET BY MOUTH EVERY DAY 90 Tablet 3    atorvastatin (LIPITOR) 40 MG Tab TAKE 1 TABLET BY MOUTH EVERY DAY 90 Tablet 3    carvedilol (COREG) 3.125 MG Tab Take 3.125 mg by mouth 2 times a day, with meals.      apixaban (ELIQUIS) 5mg Tab Take 5 mg by mouth 2 Times a Day.       No current facility-administered medications for this visit.       Physical Exam:  BP (!) 149/90 (BP Location: Left arm, Patient Position: Sitting, BP Cuff Size: Adult)   Pulse 76   Temp 36.7 °C (98 °F) (Temporal)   Ht 1.854 m (6' 1\")   Wt 86.2 kg (190 lb)   SpO2 96%   BMI 25.07 kg/m²   General: Well developed, well nourished male, in no distress.  Eyes: Conjuntiva without any obvious injection or erythema.   Cardiovascular: Heart is regular with no murmur  Lungs: Clear to auscultation bilaterally. No wheezes, rhonci or crackles heard. Respiratory effort is normal.  Abd: Soft, non-tender  Ext: No edema    HPI / Assessment / Plan:  1. Chronic bilateral low back pain, unspecified whether sciatica present  No significant changes late, however this still remains his most active issue.  He is actively working with neurosurgery, and they are evaluating him for possible spinal cord stimulator placement.  Plan:  Continue current management, continue follow-up with neurosurgery.    2. Chronic atrial fibrillation (HCC)  Doing well.  No palpitations or symptoms noted.  He is on Eliquis 5 mg twice daily and finding it to be tolerable.  No abnormal bleeding noted.  Plan:  Currently this is stable and well controlled.  The patient should continue current therapy with no changes at this time.        3. Essential (primary) hypertension  Blood pressure is mildly elevated in the office today at 149/90.  He is not doing any regular home blood pressure monitoring.  He notes that when he was at recent doctor's visits however " blood pressure was well controlled.  Plan:  Continue carvedilol 3.125 mg twice daily, losartan 25 mg daily.  I asked him to do more intensive home blood pressure monitoring so that we can adjust his medication if needed.    4. Hyperlipidemia, unspecified hyperlipidemia type  Sharan is tolerating medication well.  No intolerable side-effects noted.  No new symptoms of muscle aches or weakness.  Patient reports good adherence to medication regimen.  No change in medication since the last visit. Sharan is trying to follow a low-cholesterol diet.  Exercise is adequate but limited by his back problems.   Plan:  Currently this is stable and well controlled.  The patient should continue current therapy with no changes at this time.        5. Coronary atherosclerosis due to calcified coronary lesions of native arteries, severe, asymptomatic  No recent chest pain or shortness of breath.  He is on medical management which includes ARB, beta-blocker, statin.  He is under the care of cardiology as well, and they are evaluating him preoperatively prior to placement of the spinal cord stimulator.  Plan:  Currently this is stable and well controlled.  The patient should continue current therapy with no changes at this time.        6. Memory loss  Over the last year or so, he has had a couple of episodes of minor memory issues.  They are quite infrequent, and he describes them a bit more as forgetting things like what the plan for the day is, and he has to refresh his memory by looking at his calendar.  His wife also describes an episode in which he forgot about discussions that had regarding a upcoming vacation.  Plan:  We discussed options for this, and he is interested just in watching this for now.  - TSH; Future  - FREE THYROXINE; Future    7. Bilateral hearing loss, unspecified hearing loss type  - Referral to Audiology     Orders Placed This Encounter    TSH    FREE THYROXINE    Referral to Audiology       Return in about 6  months (around 3/5/2024).    Carlo Peters M.D.   of Internal Medicine  [facility]    This note was created using voice recognition software.  While every attempt is made to ensure accuracy of transcription, occasionally errors occur.

## 2023-12-27 DIAGNOSIS — I10 ESSENTIAL (PRIMARY) HYPERTENSION: ICD-10-CM

## 2023-12-27 DIAGNOSIS — I25.10 CORONARY ARTERY DISEASE WITHOUT ANGINA PECTORIS, UNSPECIFIED VESSEL OR LESION TYPE, UNSPECIFIED WHETHER NATIVE OR TRANSPLANTED HEART: ICD-10-CM

## 2023-12-28 RX ORDER — ATORVASTATIN CALCIUM 40 MG/1
40 TABLET, FILM COATED ORAL DAILY
Qty: 90 TABLET | Refills: 3 | Status: SHIPPED | OUTPATIENT
Start: 2023-12-28

## 2023-12-28 RX ORDER — LOSARTAN POTASSIUM 25 MG/1
25 TABLET ORAL DAILY
Qty: 90 TABLET | Refills: 3 | Status: SHIPPED | OUTPATIENT
Start: 2023-12-28

## 2024-02-16 ENCOUNTER — TELEPHONE (OUTPATIENT)
Dept: INTERNAL MEDICINE | Facility: OTHER | Age: 79
End: 2024-02-16
Payer: MEDICARE

## 2024-02-17 NOTE — TELEPHONE ENCOUNTER
"Phone Number Called: [phone]     Call outcome: Spoke to patient regarding message below.    Message: Returned pt's voicemail and pt stated he's been contacting us all week regarding his hernia on his right groin area. Pt reports that he has pain in his lower back and is \"pushing his hernia in\" for his pain to subside but the pain returns. Pt wants to know if he should wait until 3/12/24 for advisement and what surgeon should he go for evaluation. Pt was suggested to seek immediate treatment over the weekend if symptoms worsen. Please advise.  "

## 2024-02-20 ENCOUNTER — OFFICE VISIT (OUTPATIENT)
Dept: INTERNAL MEDICINE | Facility: OTHER | Age: 79
End: 2024-02-20
Payer: MEDICARE

## 2024-02-20 VITALS
HEIGHT: 73 IN | WEIGHT: 191.8 LBS | SYSTOLIC BLOOD PRESSURE: 97 MMHG | TEMPERATURE: 97.9 F | BODY MASS INDEX: 25.42 KG/M2 | HEART RATE: 76 BPM | OXYGEN SATURATION: 96 % | DIASTOLIC BLOOD PRESSURE: 64 MMHG

## 2024-02-20 DIAGNOSIS — K40.90 RIGHT INGUINAL HERNIA: ICD-10-CM

## 2024-02-20 DIAGNOSIS — Z23 NEED FOR INFLUENZA VACCINATION: ICD-10-CM

## 2024-02-20 PROCEDURE — 3078F DIAST BP <80 MM HG: CPT | Performed by: INTERNAL MEDICINE

## 2024-02-20 PROCEDURE — 90471 IMMUNIZATION ADMIN: CPT | Performed by: INTERNAL MEDICINE

## 2024-02-20 PROCEDURE — 3074F SYST BP LT 130 MM HG: CPT | Performed by: INTERNAL MEDICINE

## 2024-02-20 PROCEDURE — 90662 IIV NO PRSV INCREASED AG IM: CPT | Performed by: INTERNAL MEDICINE

## 2024-02-20 PROCEDURE — 99214 OFFICE O/P EST MOD 30 MIN: CPT | Mod: 25 | Performed by: INTERNAL MEDICINE

## 2024-02-20 ASSESSMENT — PATIENT HEALTH QUESTIONNAIRE - PHQ9: CLINICAL INTERPRETATION OF PHQ2 SCORE: 0

## 2024-02-20 NOTE — PROGRESS NOTES
Established Patient    Patient Care Team:  Carlo Peters M.D. as PCP - General    Sharan Noe is a 78 y.o. male who presents today with the following Chief Complaint(s):  Chief Complaint   Patient presents with    Hernia    and for follow up for Diagnoses of Right inguinal hernia and Need for influenza vaccination were pertinent to this visit.    ROS:     Denies any new chest pain or shortness of breath.  No changes to urinary or bowel function.  See HPI.    Past Medical History:   Diagnosis Date    A-fib (HCC) years    years    Amaurosis fugax 10/7/2021    Arthritis     hands    Benign non-nodular prostatic hyperplasia with lower urinary tract symptoms 6/22/2017    Calculus of gallbladder without cholecystitis without obstruction 6/22/2017    Carotid artery stenosis 8/26/2019    Carpal tunnel syndrome of right wrist 12/21/2015    Cerebrovascular disease 11/30/2021    Chronic atrial fibrillation (HCC) 6/22/2017    Coronary atherosclerosis due to calcified coronary lesions of native arteries, severe, asymptomatic 6/22/2017    Disease due to severe acute respiratory syndrome coronavirus 2 (SARS-CoV-2) 2/11/2021    Essential (primary) hypertension 8/26/2019    Family history of hypertension in mother     Family history of ischemic heart disease (IHD)     Family history of ischemic heart disease (IHD)     IMO load March 2020    Family history of pancreatic cancer     sister    Goiter     Thoracic aorta atherosclerosis (HCC) 6/22/2017    Vitamin D deficiency 6/22/2017     Social History     Tobacco Use    Smoking status: Never    Smokeless tobacco: Never   Vaping Use    Vaping Use: Never used   Substance Use Topics    Alcohol use: Yes     Alcohol/week: 0.0 oz     Comment: rarely    Drug use: No     Current Outpatient Medications   Medication Sig Dispense Refill    atorvastatin (LIPITOR) 40 MG Tab TAKE 1 TABLET BY MOUTH EVERY DAY 90 Tablet 3    losartan (COZAAR) 25 MG Tab TAKE 1 TABLET BY MOUTH EVERY DAY  "90 Tablet 3    carvedilol (COREG) 3.125 MG Tab Take 3.125 mg by mouth 2 times a day, with meals.      apixaban (ELIQUIS) 5mg Tab Take 5 mg by mouth 2 Times a Day.       No current facility-administered medications for this visit.       Physical Exam:  BP 97/64 (BP Location: Left arm, Patient Position: Sitting, BP Cuff Size: Adult)   Pulse 76   Temp 36.6 °C (97.9 °F) (Temporal)   Ht 1.854 m (6' 1\")   Wt 87 kg (191 lb 12.8 oz)   SpO2 96%   BMI 25.30 kg/m²   General: Well developed, well nourished male, in no distress.  Eyes: Conjuntiva without any obvious injection or erythema.   Abdomen/groin: Easily reducible right-sided inguinal hernia    HPI / Assessment / Plan:  1. Right inguinal hernia  He notes that his symptoms started about 3 to 4 weeks ago with a painful bulge in his right groin.  This was somewhat associated with having had a spinal cord stimulator placed in his back about 8 weeks ago.  He notes that many years ago, approximately 30 years, he had what he thought was an inguinal hernia however after reducing it himself, he never underwent surgery.  He has no significant alarm symptoms such as constipation or fevers chills or acute abdominal pain.  That said, he does have what he would describe is significant pain intermittently.  Plan:  I think referral to general surgery as an order for consideration of inguinal hernia repair.  We gave him symptoms to watch out for for acute strangulation of the hernia such that he would need acute intervention.  If he experiences any of the symptoms, in particular very severe pain he should proceed directly to the emergency department for further evaluation.  - Referral to General Surgery    2. Need for influenza vaccination  - Influenza Vaccine, High Dose (65+ Only)       Orders Placed This Encounter    Influenza Vaccine, High Dose (65+ Only)    Referral to General Surgery       No follow-ups on file.    Carlo Peters M.D.   of Internal " Medicine  [facility]    This note was created using voice recognition software.  While every attempt is made to ensure accuracy of transcription, occasionally errors occur.    .cc

## 2024-02-28 ENCOUNTER — TELEPHONE (OUTPATIENT)
Dept: INTERNAL MEDICINE | Facility: OTHER | Age: 79
End: 2024-02-28
Payer: MEDICARE

## 2024-02-28 ENCOUNTER — HOSPITAL ENCOUNTER (EMERGENCY)
Facility: MEDICAL CENTER | Age: 79
End: 2024-02-28
Attending: EMERGENCY MEDICINE
Payer: MEDICARE

## 2024-02-28 VITALS
OXYGEN SATURATION: 88 % | RESPIRATION RATE: 17 BRPM | HEIGHT: 73 IN | DIASTOLIC BLOOD PRESSURE: 68 MMHG | SYSTOLIC BLOOD PRESSURE: 121 MMHG | HEART RATE: 68 BPM | TEMPERATURE: 98.7 F | WEIGHT: 190 LBS | BODY MASS INDEX: 25.18 KG/M2

## 2024-02-28 DIAGNOSIS — G89.29 CHRONIC BILATERAL LOW BACK PAIN WITHOUT SCIATICA: ICD-10-CM

## 2024-02-28 DIAGNOSIS — M54.50 CHRONIC BILATERAL LOW BACK PAIN WITHOUT SCIATICA: ICD-10-CM

## 2024-02-28 DIAGNOSIS — J11.1 INFLUENZA: ICD-10-CM

## 2024-02-28 DIAGNOSIS — E87.1 HYPONATREMIA: ICD-10-CM

## 2024-02-28 DIAGNOSIS — R53.1 WEAKNESS: ICD-10-CM

## 2024-02-28 LAB
ALBUMIN SERPL BCP-MCNC: 3.8 G/DL (ref 3.2–4.9)
ALBUMIN/GLOB SERPL: 1.4 G/DL
ALP SERPL-CCNC: 84 U/L (ref 30–99)
ALT SERPL-CCNC: 18 U/L (ref 2–50)
ANION GAP SERPL CALC-SCNC: 13 MMOL/L (ref 7–16)
AST SERPL-CCNC: 47 U/L (ref 12–45)
BASOPHILS # BLD AUTO: 0.4 % (ref 0–1.8)
BASOPHILS # BLD: 0.03 K/UL (ref 0–0.12)
BILIRUB SERPL-MCNC: 1.9 MG/DL (ref 0.1–1.5)
BUN SERPL-MCNC: 20 MG/DL (ref 8–22)
CALCIUM ALBUM COR SERPL-MCNC: 8.7 MG/DL (ref 8.5–10.5)
CALCIUM SERPL-MCNC: 8.5 MG/DL (ref 8.5–10.5)
CHLORIDE SERPL-SCNC: 99 MMOL/L (ref 96–112)
CO2 SERPL-SCNC: 22 MMOL/L (ref 20–33)
CREAT SERPL-MCNC: 1.47 MG/DL (ref 0.5–1.4)
EOSINOPHIL # BLD AUTO: 0.01 K/UL (ref 0–0.51)
EOSINOPHIL NFR BLD: 0.1 % (ref 0–6.9)
ERYTHROCYTE [DISTWIDTH] IN BLOOD BY AUTOMATED COUNT: 43.8 FL (ref 35.9–50)
FLUAV RNA SPEC QL NAA+PROBE: POSITIVE
FLUBV RNA SPEC QL NAA+PROBE: NEGATIVE
GFR SERPLBLD CREATININE-BSD FMLA CKD-EPI: 49 ML/MIN/1.73 M 2
GLOBULIN SER CALC-MCNC: 2.7 G/DL (ref 1.9–3.5)
GLUCOSE SERPL-MCNC: 105 MG/DL (ref 65–99)
HCT VFR BLD AUTO: 39.8 % (ref 42–52)
HGB BLD-MCNC: 13.9 G/DL (ref 14–18)
IMM GRANULOCYTES # BLD AUTO: 0.03 K/UL (ref 0–0.11)
IMM GRANULOCYTES NFR BLD AUTO: 0.4 % (ref 0–0.9)
LYMPHOCYTES # BLD AUTO: 1.13 K/UL (ref 1–4.8)
LYMPHOCYTES NFR BLD: 16.7 % (ref 22–41)
MCH RBC QN AUTO: 32 PG (ref 27–33)
MCHC RBC AUTO-ENTMCNC: 34.9 G/DL (ref 32.3–36.5)
MCV RBC AUTO: 91.7 FL (ref 81.4–97.8)
MONOCYTES # BLD AUTO: 0.67 K/UL (ref 0–0.85)
MONOCYTES NFR BLD AUTO: 9.9 % (ref 0–13.4)
NEUTROPHILS # BLD AUTO: 4.9 K/UL (ref 1.82–7.42)
NEUTROPHILS NFR BLD: 72.5 % (ref 44–72)
NRBC # BLD AUTO: 0 K/UL
NRBC BLD-RTO: 0 /100 WBC (ref 0–0.2)
PLATELET # BLD AUTO: 109 K/UL (ref 164–446)
PMV BLD AUTO: 9.7 FL (ref 9–12.9)
POTASSIUM SERPL-SCNC: 3.9 MMOL/L (ref 3.6–5.5)
PROT SERPL-MCNC: 6.5 G/DL (ref 6–8.2)
RBC # BLD AUTO: 4.34 M/UL (ref 4.7–6.1)
RSV RNA SPEC QL NAA+PROBE: NEGATIVE
SARS-COV-2 RNA RESP QL NAA+PROBE: NOTDETECTED
SODIUM SERPL-SCNC: 134 MMOL/L (ref 135–145)
WBC # BLD AUTO: 6.8 K/UL (ref 4.8–10.8)

## 2024-02-28 PROCEDURE — 700105 HCHG RX REV CODE 258: Performed by: EMERGENCY MEDICINE

## 2024-02-28 PROCEDURE — 700102 HCHG RX REV CODE 250 W/ 637 OVERRIDE(OP): Performed by: EMERGENCY MEDICINE

## 2024-02-28 PROCEDURE — 96374 THER/PROPH/DIAG INJ IV PUSH: CPT

## 2024-02-28 PROCEDURE — A9270 NON-COVERED ITEM OR SERVICE: HCPCS | Performed by: EMERGENCY MEDICINE

## 2024-02-28 PROCEDURE — 36415 COLL VENOUS BLD VENIPUNCTURE: CPT

## 2024-02-28 PROCEDURE — 700111 HCHG RX REV CODE 636 W/ 250 OVERRIDE (IP): Performed by: EMERGENCY MEDICINE

## 2024-02-28 PROCEDURE — 99284 EMERGENCY DEPT VISIT MOD MDM: CPT

## 2024-02-28 PROCEDURE — 85025 COMPLETE CBC W/AUTO DIFF WBC: CPT

## 2024-02-28 PROCEDURE — 0241U HCHG SARS-COV-2 COVID-19 NFCT DS RESP RNA 4 TRGT ED POC: CPT

## 2024-02-28 PROCEDURE — 80053 COMPREHEN METABOLIC PANEL: CPT

## 2024-02-28 RX ORDER — ACETAMINOPHEN 325 MG/1
650 TABLET ORAL ONCE
Status: COMPLETED | OUTPATIENT
Start: 2024-02-28 | End: 2024-02-28

## 2024-02-28 RX ORDER — KETOROLAC TROMETHAMINE 15 MG/ML
15 INJECTION, SOLUTION INTRAMUSCULAR; INTRAVENOUS ONCE
Status: COMPLETED | OUTPATIENT
Start: 2024-02-28 | End: 2024-02-28

## 2024-02-28 RX ORDER — GUANFACINE 1 MG/1
1 TABLET ORAL DAILY
Qty: 30 TABLET | Refills: 0 | Status: SHIPPED | OUTPATIENT
Start: 2024-02-28 | End: 2024-03-14

## 2024-02-28 RX ORDER — OSELTAMIVIR PHOSPHATE 30 MG/1
30 CAPSULE ORAL 2 TIMES DAILY
Qty: 10 CAPSULE | Refills: 0 | Status: ACTIVE | OUTPATIENT
Start: 2024-02-28 | End: 2024-03-04

## 2024-02-28 RX ORDER — SODIUM CHLORIDE 9 MG/ML
1000 INJECTION, SOLUTION INTRAVENOUS ONCE
Status: COMPLETED | OUTPATIENT
Start: 2024-02-28 | End: 2024-02-28

## 2024-02-28 RX ORDER — OSELTAMIVIR PHOSPHATE 45 MG/1
45 CAPSULE ORAL 2 TIMES DAILY
Qty: 10 CAPSULE | Refills: 0 | Status: SHIPPED | OUTPATIENT
Start: 2024-02-28 | End: 2024-02-28

## 2024-02-28 RX ADMIN — SODIUM CHLORIDE 1000 ML: 9 INJECTION, SOLUTION INTRAVENOUS at 16:26

## 2024-02-28 RX ADMIN — ACETAMINOPHEN 650 MG: 325 TABLET, FILM COATED ORAL at 16:30

## 2024-02-28 RX ADMIN — KETOROLAC TROMETHAMINE 15 MG: 15 INJECTION, SOLUTION INTRAMUSCULAR; INTRAVENOUS at 16:30

## 2024-02-28 ASSESSMENT — PAIN DESCRIPTION - PAIN TYPE
TYPE: ACUTE PAIN
TYPE: ACUTE PAIN

## 2024-02-28 NOTE — ED TRIAGE NOTES
.  Chief Complaint   Patient presents with    Flu Like Symptoms    Weakness     GLF today, -head, +thinners      Pt to triage via wheel chair for above complaint. Pt reports onset of flu like s/s today. Fever at home.   Pt educated on triage process and returned to lobby.

## 2024-02-28 NOTE — ED PROVIDER NOTES
"  ER Provider Note    Scribed for Chon Cates M.d. by Bryan Gordillo. 2/28/2024  3:52 PM    Primary Care Provider: Carlo Peters M.D.    CHIEF COMPLAINT  Chief Complaint   Patient presents with    Flu Like Symptoms    Weakness     GLF today, -head, +thinners     EXTERNAL RECORDS REVIEWED  Outpatient Notes shows that the patient is being followed by Cardiology for stenosis of the carotid artery and atrial fibrillation.     HPI/ROS  LIMITATION TO HISTORY   Select: : None  OUTSIDE HISTORIAN(S):  Family , who is at bedside and is able to help contribute to the patient's history.    Sharan Noe is a 78 y.o. male who presents to the ED for evaluation of an acute ground level fall onset prior to arrival. Patient reports that he has been having some flu like symptoms such cough, nausea and vomiting, and has been feeling weak for the past few days. Patient adds that he has chronic back pain, and has trouble walking at times because of it. He uses a cane at baseline. He has been having some increased back pain as well these last few days. He has been taking Tylenol and Advil as needed. Denies any narcotic pain medication use. Family notes that the patient was \"as fine as he can get\" last night, when they went out to dinner. Patient was able to walk normally at that time. However, family reports that today, he fell. Negative loss of consciousness, no head strike. Patient states that he fell due to his back pain, but family believes he fell due to being weak. He was then brought here to the ED for further evaluation. Patient has associated decreased appetite and fever. Highest at home temperature was 100.3 °F. Currently in the ED, he has a temperature of 100.9 °F.  Denies any diarrhea or shortness of breath. He is vaccinated for influenza. He is currently on blood thinners for his atrial fibrillation. Additional history of a CVA and hypertension. No known drug allergies.     PAST MEDICAL HISTORY  Past Medical " History:   Diagnosis Date    A-fib (HCC) years    years    Amaurosis fugax 10/7/2021    Arthritis     hands    Benign non-nodular prostatic hyperplasia with lower urinary tract symptoms 6/22/2017    Calculus of gallbladder without cholecystitis without obstruction 6/22/2017    Carotid artery stenosis 8/26/2019    Carpal tunnel syndrome of right wrist 12/21/2015    Cerebrovascular disease 11/30/2021    Chronic atrial fibrillation (HCC) 6/22/2017    Coronary atherosclerosis due to calcified coronary lesions of native arteries, severe, asymptomatic 6/22/2017    Disease due to severe acute respiratory syndrome coronavirus 2 (SARS-CoV-2) 2/11/2021    Essential (primary) hypertension 8/26/2019    Family history of hypertension in mother     Family history of ischemic heart disease (IHD)     Family history of ischemic heart disease (IHD)     IMO load March 2020    Family history of pancreatic cancer     sister    Goiter     Thoracic aorta atherosclerosis (HCC) 6/22/2017    Vitamin D deficiency 6/22/2017       SURGICAL HISTORY  Past Surgical History:   Procedure Laterality Date    AK TRANSURETHRAL ELEC-SURG PROSTATECTOM  04/11/2019    Procedure: TURP (TRANSURETHRAL RESECTION OF PROSTATE);  Surgeon: Francisco Valladares M.D.;  Location: SURGERY [facility];  Service: Urology    CYSTOSCOPY  04/11/2019    Procedure: CYSTOSCOPY;  Surgeon: Francisco Valladares M.D.;  Location: SURGERY [facility];  Service: Urology    LUMBAR LAMINECTOMY DISKECTOMY  2014    Dr. Dillon    CARPAL TUNNEL RELEASE      CATARACT EXTRACTION WITH IOL Bilateral     OTHER ORTHOPEDIC SURGERY Right     knee arthroscopy, cartilage repair       FAMILY HISTORY  Family History   Problem Relation Age of Onset    Heart Attack Father 89    Hypertension Mother     Dementia Mother 80    Cancer Sister 68        pancreatic    Other Sister         Brain aneurysm       SOCIAL HISTORY   reports that he has never smoked. He has never used smokeless tobacco. He  "reports current alcohol use. He reports that he does not use drugs.    CURRENT MEDICATIONS  Previous Medications    APIXABAN (ELIQUIS) 5MG TAB    Take 5 mg by mouth 2 Times a Day.    ATORVASTATIN (LIPITOR) 40 MG TAB    TAKE 1 TABLET BY MOUTH EVERY DAY    CARVEDILOL (COREG) 3.125 MG TAB    Take 3.125 mg by mouth 2 times a day, with meals.    LOSARTAN (COZAAR) 25 MG TAB    TAKE 1 TABLET BY MOUTH EVERY DAY       ALLERGIES  Patient has no known allergies.    PHYSICAL EXAM  /75   Pulse 75   Temp (!) 38.3 °C (100.9 °F) (Oral)   Resp 16   Ht 1.854 m (6' 1\")   Wt 86.2 kg (190 lb)   SpO2 93%   BMI 25.07 kg/m²   Constitutional: Well developed, Well nourished, No acute distress, Non-toxic appearance.   HENT: Normocephalic, Atraumatic, Bilateral external ears normal, Oropharynx is clear mucous membranes are dry. No oral exudates or nasal discharge.   Eyes: Pupils are equal round and reactive, EOMI, Conjunctiva normal, No discharge.   Neck: Normal range of motion, No tenderness, Supple, No stridor. No meningismus.  Lymphatic: No lymphadenopathy noted.   Cardiovascular: Regular rate and rhythm without murmur rub or gallop.  Thorax & Lungs: Clear breath sounds bilaterally without wheezes, rhonchi or rales. There is no chest wall tenderness.   Abdomen: Soft non-tender non-distended. There is no rebound or guarding. No organomegaly is appreciated. Bowel sounds are normal.  Skin: Normal without rash.   Back: No CVA or spinal tenderness.   Extremities: Intact distal pulses, No edema, No tenderness, No cyanosis, No clubbing. Capillary refill is less than 2 seconds.  Musculoskeletal: Good range of motion in all major joints. No tenderness to palpation or major deformities noted.   Neurologic: Alert & oriented x 3, Normal motor function, Normal sensory function, No focal deficits noted. Reflexes are normal.  Psychiatric: Affect normal, Judgment normal, Mood normal. There is no suicidal ideation or patient reported " hallucinations.      DIAGNOSTIC STUDIES    Labs:   Labs Reviewed   POC COV-2, FLU A/B, RSV BY PCR - Abnormal; Notable for the following components:       Result Value    POC Influenza A RNA, PCR POSITIVE (*)     All other components within normal limits   CBC WITH DIFFERENTIAL   COMP METABOLIC PANEL   POCT COV-2, FLU A/B, RSV BY PCR        COURSE & MEDICAL DECISION MAKING     ED Observation Status? No; Patient does not meet criteria for ED Observation.     INITIAL ASSESSMENT, COURSE AND PLAN  Care Narrative:     3:52 PM - Patient was seen and evaluated at bedside. Patient presents to the ED for a ground level fall and flu like symptoms. After my exam, I discussed with the patient the plan of care, which includes treating the patient with medication for their symptoms, as well as obtaining lab work for further evaluation. Patient understands and verbalizes agreement to plan of care.  Patient had a viral panel done. I discussed the results with the patient, and informed them that they tested positive for Influenza A. He will be treated with Tylenol 650 mg, Toradol 15 mg, and NS infusion bolus 1000 mL for his symptoms.    HYDRATION: Based on the patient's presentation of Dehydration the patient was given IV fluids. IV Hydration was used because oral hydration was not adequate alone. Upon recheck following hydration, the patient was improved.    Patient improved after IV fluids.  Laboratory evaluation reveals slightly low sodium at 134 with low GFR but no other significant abnormalities.  Creatinine is elevated 1.47 secondary to dehydration.  He is positive for influenza A which is the cause for all of his symptoms and he understands the importance of hydration at home    DISPOSITION AND DISCUSSIONS  I have discussed management of the patient with the following physicians and JEANIE's:  None    Discussion of management with other QHP or appropriate source(s): None     Escalation of care considered, and ultimately not  performed: acute inpatient care management, however at this time, the patient is most appropriate for outpatient management.  Considered admission to the hospital secondary to weakness but he perked up with some IV fluids and ultimately I was able to demonstrate his ability to ambulate with a walker    Barriers to care at this time, including but not limited to:  None noted .     FINAL DIAGNOSIS  1. Weakness    2. Hyponatremia    3. Influenza    4. Chronic bilateral low back pain without sciatica         Bryan FLORES (Scribe), am scribing for, and in the presence of, Chon Cates M.D..    Electronically signed by: Bryan Gordillo ([place]jo[place]), 2/28/2024    IChon M.D. personally performed the services described in this documentation, as scribed by Bryan Gordillo in my presence, and it is both accurate and complete.      The note accurately reflects work and decisions made by me.  Chon Cates M.D.  2/28/2024  5:39 PM

## 2024-02-28 NOTE — ED NOTES
Taken patient from triage waiting room, ambulatory with steady gait, alert/ oriented x 4.Verified patient identification.  Assumed patient care.   Placed on patient room. Given the call light and instructed to call for any assistance needed/ or concerns.   Bed on lowest position, side rails up, breaks locked. Awaiting for ERP.

## 2024-02-29 NOTE — DISCHARGE INSTRUCTIONS
Please drink plenty of fluids that have electrolytes such as propel and/or Gatorade and avoid caffeine products which are dehydrating

## 2024-02-29 NOTE — ED NOTES
IVF has completed.  IV d/c'ed.  PT. Wife had questions about discharge medications.  Dr. Kern in to speak with pt. And wife, medications to be sent to pharmacy.  Pt. And wife verbalized understanding of discharge instructions. Pt. To ED lobby via w/c.

## 2024-02-29 NOTE — ED PROVIDER NOTES
Patient requesting medications to help with his cough and influenza, Guanefacin and Tamiflu will be prescribed

## 2024-02-29 NOTE — ED NOTES
Pt discharged home. GCS 15. IV discontinued and gauze placed, pt in possession of belongings. Pt provided discharge education and information pertaining to oral hydration.  Pt received copy of discharge instructions and verbalized understanding.

## 2024-02-29 NOTE — TELEPHONE ENCOUNTER
Spoke to patient. Patient states he was diagnosed with the Flu at ED. Patient is scheduled for an appointment next week.

## 2024-03-12 ENCOUNTER — OFFICE VISIT (OUTPATIENT)
Dept: INTERNAL MEDICINE | Facility: OTHER | Age: 79
End: 2024-03-12
Payer: MEDICARE

## 2024-03-12 VITALS
SYSTOLIC BLOOD PRESSURE: 134 MMHG | BODY MASS INDEX: 24.65 KG/M2 | TEMPERATURE: 97.8 F | HEIGHT: 73 IN | HEART RATE: 75 BPM | WEIGHT: 186 LBS | OXYGEN SATURATION: 95 % | DIASTOLIC BLOOD PRESSURE: 84 MMHG

## 2024-03-12 DIAGNOSIS — I10 ESSENTIAL (PRIMARY) HYPERTENSION: ICD-10-CM

## 2024-03-12 DIAGNOSIS — G89.29 CHRONIC BILATERAL LOW BACK PAIN, UNSPECIFIED WHETHER SCIATICA PRESENT: ICD-10-CM

## 2024-03-12 DIAGNOSIS — J10.1 INFLUENZA A: ICD-10-CM

## 2024-03-12 DIAGNOSIS — E78.5 HYPERLIPIDEMIA, UNSPECIFIED HYPERLIPIDEMIA TYPE: ICD-10-CM

## 2024-03-12 DIAGNOSIS — I48.20 CHRONIC ATRIAL FIBRILLATION (HCC): ICD-10-CM

## 2024-03-12 DIAGNOSIS — M54.50 CHRONIC BILATERAL LOW BACK PAIN, UNSPECIFIED WHETHER SCIATICA PRESENT: ICD-10-CM

## 2024-03-12 PROCEDURE — 3075F SYST BP GE 130 - 139MM HG: CPT | Performed by: INTERNAL MEDICINE

## 2024-03-12 PROCEDURE — 3079F DIAST BP 80-89 MM HG: CPT | Performed by: INTERNAL MEDICINE

## 2024-03-12 PROCEDURE — 99214 OFFICE O/P EST MOD 30 MIN: CPT | Performed by: INTERNAL MEDICINE

## 2024-03-12 ASSESSMENT — FIBROSIS 4 INDEX: FIB4 SCORE: 7.93

## 2024-03-12 NOTE — PROGRESS NOTES
Established Patient    Patient Care Team:  Carlo Peters M.D. as PCP - General    Sharan Noe is a 79 y.o. male who presents today with the following Chief Complaint(s):  Chief Complaint   Patient presents with    Follow-Up    and for follow up for Diagnoses of Influenza A, Chronic atrial fibrillation (HCC), Hyperlipidemia, unspecified hyperlipidemia type, Essential (primary) hypertension, and Chronic bilateral low back pain, unspecified whether sciatica present were pertinent to this visit.    ROS:     Denies any new chest pain or shortness of breath.  No changes to urinary or bowel function.  See HPI.    Past Medical History:   Diagnosis Date    A-fib (HCC) years    years    Amaurosis fugax 10/7/2021    Arthritis     hands    Benign non-nodular prostatic hyperplasia with lower urinary tract symptoms 6/22/2017    Calculus of gallbladder without cholecystitis without obstruction 6/22/2017    Carotid artery stenosis 8/26/2019    Carpal tunnel syndrome of right wrist 12/21/2015    Cerebrovascular disease 11/30/2021    Chronic atrial fibrillation (HCC) 6/22/2017    Coronary atherosclerosis due to calcified coronary lesions of native arteries, severe, asymptomatic 6/22/2017    Disease due to severe acute respiratory syndrome coronavirus 2 (SARS-CoV-2) 2/11/2021    Essential (primary) hypertension 8/26/2019    Family history of hypertension in mother     Family history of ischemic heart disease (IHD)     Family history of ischemic heart disease (IHD)     IMO load March 2020    Family history of pancreatic cancer     sister    Goiter     Thoracic aorta atherosclerosis (HCC) 6/22/2017    Vitamin D deficiency 6/22/2017     Social History     Tobacco Use    Smoking status: Never    Smokeless tobacco: Never   Vaping Use    Vaping Use: Never used   Substance Use Topics    Alcohol use: Yes     Alcohol/week: 0.0 oz     Comment: rarely    Drug use: No     Current Outpatient Medications   Medication Sig Dispense  "Refill    atorvastatin (LIPITOR) 40 MG Tab TAKE 1 TABLET BY MOUTH EVERY DAY 90 Tablet 3    losartan (COZAAR) 25 MG Tab TAKE 1 TABLET BY MOUTH EVERY DAY 90 Tablet 3    carvedilol (COREG) 3.125 MG Tab Take 3.125 mg by mouth 2 times a day, with meals.      apixaban (ELIQUIS) 5mg Tab Take 5 mg by mouth 2 Times a Day.      guanFACINE (TENEX) 1 MG Tab Take 1 Tablet by mouth every day. 30 Tablet 0     No current facility-administered medications for this visit.       Physical Exam:  /84 (BP Location: Left arm, Patient Position: Sitting, BP Cuff Size: Adult)   Pulse 75   Temp 36.6 °C (97.8 °F) (Temporal)   Ht 1.854 m (6' 1\")   Wt 84.4 kg (186 lb)   SpO2 95%   BMI 24.54 kg/m²   General: Well developed, well nourished male, in no distress.  Eyes: Conjuntiva without any obvious injection or erythema.   Cardiovascular: Heart is regular with no murmur  Lungs: Clear to auscultation bilaterally. No wheezes, rhonci or crackles heard. Respiratory effort is normal.  Abd: Soft, non-tender  Ext: No edema      HPI / Assessment / Plan:  1. Influenza A  His symptoms started very abruptly with an episode of weakness and a ground level fall.  He didn't have much URI symptoms initially, but subsequent to the ER visit he did develop some cough.  He ended up at the ER where he was diagnosed with Influenza A.  He was given Tamilfu and some fluids and he began to feel better.  He is now mostly back to normal, but does have the chronic MSK issues.      2. Chronic atrial fibrillation (HCC)  Currently this is stable and well controlled.  The patient should continue current therapy with no changes at this time.   He is on apixaban.  With his recent flu, he had a fall, and had some bruising.    3. Hyperlipidemia, unspecified hyperlipidemia type  Sharan is tolerating medication well.  No intolerable side-effects noted.  No new symptoms of muscle aches or weakness.  Patient reports good adherence to medication regimen.  No change in " medication since the last visit. Sharan is trying to follow a low-cholesterol diet.  Exercise is adequate.  Plan:  Currently this is stable and well controlled.  The patient should continue current therapy with no changes at this time.      4. Essential (primary) hypertension  Blood pressure is controlled in the office today.  Sharan did not bring blood pressure logs to the office today. Home Blood Pressure readings are not consistently recorded by the patient.   Sharan reports good adherence to medication regimen with no changes.  No dizziness reported.  No intolerable side effects noted.  Plan:  Currently this is stable and well controlled.  The patient should continue current therapy with no changes at this time.         5. Back Pain  He had some significant increase in pain recently, which has improved since his spinal cord stimulator was adjusted yesterday.    Return in about 6 months (around 9/12/2024).    Carlo Peters M.D.   of Internal Medicine  [facility]    This note was created using voice recognition software.  While every attempt is made to ensure accuracy of transcription, occasionally errors occur.    .cc

## 2024-09-17 ENCOUNTER — OFFICE VISIT (OUTPATIENT)
Dept: INTERNAL MEDICINE | Facility: OTHER | Age: 79
End: 2024-09-17
Payer: MEDICARE

## 2024-09-17 VITALS
SYSTOLIC BLOOD PRESSURE: 135 MMHG | DIASTOLIC BLOOD PRESSURE: 87 MMHG | OXYGEN SATURATION: 94 % | HEIGHT: 73 IN | WEIGHT: 192.8 LBS | BODY MASS INDEX: 25.55 KG/M2 | TEMPERATURE: 97.6 F | HEART RATE: 72 BPM

## 2024-09-17 DIAGNOSIS — G89.29 CHRONIC BILATERAL LOW BACK PAIN, UNSPECIFIED WHETHER SCIATICA PRESENT: ICD-10-CM

## 2024-09-17 DIAGNOSIS — M54.50 CHRONIC BILATERAL LOW BACK PAIN, UNSPECIFIED WHETHER SCIATICA PRESENT: ICD-10-CM

## 2024-09-17 DIAGNOSIS — E78.5 HYPERLIPIDEMIA, UNSPECIFIED HYPERLIPIDEMIA TYPE: ICD-10-CM

## 2024-09-17 DIAGNOSIS — K59.09 CHRONIC CONSTIPATION: ICD-10-CM

## 2024-09-17 DIAGNOSIS — I48.20 CHRONIC ATRIAL FIBRILLATION (HCC): ICD-10-CM

## 2024-09-17 DIAGNOSIS — I10 ESSENTIAL (PRIMARY) HYPERTENSION: ICD-10-CM

## 2024-09-17 PROCEDURE — 3079F DIAST BP 80-89 MM HG: CPT | Performed by: INTERNAL MEDICINE

## 2024-09-17 PROCEDURE — 99214 OFFICE O/P EST MOD 30 MIN: CPT | Performed by: INTERNAL MEDICINE

## 2024-09-17 PROCEDURE — 3075F SYST BP GE 130 - 139MM HG: CPT | Performed by: INTERNAL MEDICINE

## 2024-09-17 ASSESSMENT — FIBROSIS 4 INDEX: FIB4 SCORE: 5.33

## 2024-09-17 NOTE — PROGRESS NOTES
Established Patient    Patient Care Team:  Carlo Peters M.D. as PCP - General    Sharan Noe is a 79 y.o. male who presents today with the following Chief Complaint(s):  Chief Complaint   Patient presents with    Follow-Up    and for follow up for Diagnoses of Essential (primary) hypertension, Hyperlipidemia, unspecified hyperlipidemia type, Chronic atrial fibrillation (HCC), Chronic constipation, and Chronic bilateral low back pain, unspecified whether sciatica present were pertinent to this visit.    ROS:     Denies any new chest pain or shortness of breath.  No changes to urinary or bowel function.  See HPI.    Past Medical History:   Diagnosis Date    A-fib (HCC) years    years    Amaurosis fugax 10/7/2021    Arthritis     hands    Benign non-nodular prostatic hyperplasia with lower urinary tract symptoms 6/22/2017    Calculus of gallbladder without cholecystitis without obstruction 6/22/2017    Carotid artery stenosis 8/26/2019    Carpal tunnel syndrome of right wrist 12/21/2015    Cerebrovascular disease 11/30/2021    Chronic atrial fibrillation (HCC) 6/22/2017    Coronary atherosclerosis due to calcified coronary lesions of native arteries, severe, asymptomatic 6/22/2017    Disease due to severe acute respiratory syndrome coronavirus 2 (SARS-CoV-2) 2/11/2021    Essential (primary) hypertension 8/26/2019    Family history of hypertension in mother     Family history of ischemic heart disease (IHD)     Family history of ischemic heart disease (IHD)     IMO load March 2020    Family history of pancreatic cancer     sister    Goiter     Thoracic aorta atherosclerosis (HCC) 6/22/2017    Vitamin D deficiency 6/22/2017     Social History     Tobacco Use    Smoking status: Never    Smokeless tobacco: Never   Vaping Use    Vaping status: Never Used   Substance Use Topics    Alcohol use: Yes     Alcohol/week: 0.0 oz     Comment: rarely    Drug use: No     Current Outpatient Medications   Medication  "Sig Dispense Refill    atorvastatin (LIPITOR) 40 MG Tab TAKE 1 TABLET BY MOUTH EVERY DAY 90 Tablet 3    losartan (COZAAR) 25 MG Tab TAKE 1 TABLET BY MOUTH EVERY DAY 90 Tablet 3    carvedilol (COREG) 3.125 MG Tab Take 3.125 mg by mouth 2 times a day, with meals.      apixaban (ELIQUIS) 5mg Tab Take 5 mg by mouth 2 Times a Day.       No current facility-administered medications for this visit.       Physical Exam:  /87 (BP Location: Right arm, Patient Position: Sitting, BP Cuff Size: Adult)   Pulse 72   Temp 36.4 °C (97.6 °F) (Temporal)   Ht 1.854 m (6' 1\")   Wt 87.5 kg (192 lb 12.8 oz)   SpO2 94%   BMI 25.44 kg/m²   General: Well developed, well nourished male, in no distress.  Eyes: Conjuntiva without any obvious injection or erythema.   Cardiovascular: Heart is irregular with no murmur  Lungs: Clear to auscultation bilaterally. No wheezes, rhonci or crackles heard. Respiratory effort is normal.  Abd: Soft, non-tender  Ext: No edema    HPI / Assessment / Plan:  1. Essential (primary) hypertension  Blood pressure is 135/87 here in the office today.  He is not taking any blood pressure readings at home.  He is on losartan 25 mg daily as well as 3.125 mg twice daily of carvedilol.  Plan:  Reasonable control in the office.  He can continue on his current medications however I would like him to do some home blood pressure monitoring.  - CBC WITHOUT DIFFERENTIAL; Future  - Comp Metabolic Panel; Future  - Lipid Profile; Future    2. Hyperlipidemia, unspecified hyperlipidemia type  Sharan is tolerating medication well.  No intolerable side-effects noted.  No new symptoms of muscle aches or weakness.  Patient reports good adherence to medication regimen.  No change in medication since the last visit. Sharan is trying to follow a low-cholesterol diet.  Exercise is adequate, but limited by his chronic back pain.  Plan:  Currently this is stable and well controlled.  The patient should continue current therapy with no " changes at this time.      - Comp Metabolic Panel; Future  - Lipid profile    3. Chronic atrial fibrillation (HCC)  Overall stable.  He follows closely with cardiology.  He is on Eliquis 5 mg twice daily and does not note any abnormal bleeding.  Plan:  Currently this is stable and well controlled.  The patient should continue current therapy with no changes at this time.        4. Chronic constipation  This is an ongoing issue for him, and he and it makes sense that he has some postprandial lower abdominal cramping pain as well.  We discussed options and likely etiologies of his chronic constipation which include inadequate hydration.  I would like him to increase his hydration as best as possible, but also recommended he consider over-the-counter fiber, Senokot, and possibly MiraLAX if needed.  He reports that he has all of these at home and will consider starting them.    5. Chronic bilateral low back pain, unspecified whether sciatica present  He got a stimulator placed about 9 months ago, and he does not think it has been very effective.  His wife was in the room with him today disagrees and feels like it has helped him.  He continues to follow with pain management.    Health Maintenance  Recommended updated influenza vaccine and COVID-vaccine.       Orders Placed This Encounter    CBC WITHOUT DIFFERENTIAL    Comp Metabolic Panel    Lipid Profile       Return in about 6 months (around 3/17/2025).    Carlo Peters M.D.  Professor of Medicine  [employer]    This note was created using voice recognition software.  While every attempt is made to ensure accuracy of transcription, occasionally errors occur.

## 2024-09-17 NOTE — PATIENT INSTRUCTIONS
I recommend that you get an updated flu vaccine this fall.  You get get that in the office when available, or at your local pharmacy.     I strongly recommend that you get an updated COVID booster.  This is available to you at your local pharmacy.     I recommend you get the RSV vaccine.  This is available at your local pharmacy.     Try to increase your hydration level at home, this should help the bowel movement.  You can also try OTC meds such as Senna and Miralax.

## 2024-10-22 ENCOUNTER — TELEPHONE (OUTPATIENT)
Dept: INTERNAL MEDICINE | Facility: OTHER | Age: 79
End: 2024-10-22

## 2024-10-22 ENCOUNTER — OFFICE VISIT (OUTPATIENT)
Dept: INTERNAL MEDICINE | Facility: OTHER | Age: 79
End: 2024-10-22
Payer: MEDICARE

## 2024-10-22 VITALS
DIASTOLIC BLOOD PRESSURE: 85 MMHG | OXYGEN SATURATION: 96 % | WEIGHT: 196.4 LBS | BODY MASS INDEX: 26.03 KG/M2 | TEMPERATURE: 97.3 F | SYSTOLIC BLOOD PRESSURE: 141 MMHG | HEIGHT: 73 IN | HEART RATE: 63 BPM
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
## 2024-10-22 DIAGNOSIS — R42 DIZZINESS: ICD-10-CM

## 2024-10-22 DIAGNOSIS — S09.90XA TRAUMATIC INJURY OF HEAD, INITIAL ENCOUNTER: ICD-10-CM

## 2024-10-22 ASSESSMENT — ENCOUNTER SYMPTOMS
WEAKNESS: 0
PSYCHIATRIC NEGATIVE: 1
CHILLS: 0
BLURRED VISION: 0
FEVER: 0
BACK PAIN: 1
DIZZINESS: 1
COUGH: 0
NAUSEA: 1
PALPITATIONS: 0
SHORTNESS OF BREATH: 0

## 2024-10-22 ASSESSMENT — FIBROSIS 4 INDEX
FIB4 SCORE: 5.33
FIB4 SCORE: 5.33

## 2024-10-22 ASSESSMENT — PAIN SCALES - GENERAL: PAINLEVEL: NO PAIN

## 2024-10-23 ENCOUNTER — HOSPITAL ENCOUNTER (OUTPATIENT)
Dept: RADIOLOGY | Facility: MEDICAL CENTER | Age: 79
End: 2024-10-23
Payer: MEDICARE

## 2024-10-23 DIAGNOSIS — S09.90XA TRAUMATIC INJURY OF HEAD, INITIAL ENCOUNTER: ICD-10-CM

## 2024-10-23 DIAGNOSIS — R42 DIZZINESS: ICD-10-CM
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
## 2024-10-23 PROCEDURE — 70450 CT HEAD/BRAIN W/O DYE: CPT

## 2024-11-05 ENCOUNTER — OFFICE VISIT (OUTPATIENT)
Dept: INTERNAL MEDICINE | Facility: OTHER | Age: 79
End: 2024-11-05
Payer: MEDICARE

## 2024-11-05 VITALS
OXYGEN SATURATION: 95 % | WEIGHT: 192.2 LBS | SYSTOLIC BLOOD PRESSURE: 118 MMHG | HEART RATE: 63 BPM | BODY MASS INDEX: 25.47 KG/M2 | TEMPERATURE: 97.7 F | DIASTOLIC BLOOD PRESSURE: 79 MMHG | HEIGHT: 73 IN

## 2024-11-05 DIAGNOSIS — N28.9 RENAL INSUFFICIENCY: ICD-10-CM

## 2024-11-05 DIAGNOSIS — I10 ESSENTIAL (PRIMARY) HYPERTENSION: ICD-10-CM

## 2024-11-05 DIAGNOSIS — M54.50 CHRONIC BILATERAL LOW BACK PAIN, UNSPECIFIED WHETHER SCIATICA PRESENT: ICD-10-CM

## 2024-11-05 DIAGNOSIS — I48.20 CHRONIC ATRIAL FIBRILLATION (HCC): ICD-10-CM

## 2024-11-05 DIAGNOSIS — G89.29 CHRONIC BILATERAL LOW BACK PAIN, UNSPECIFIED WHETHER SCIATICA PRESENT: ICD-10-CM

## 2024-11-05 DIAGNOSIS — R42 POSITIONAL LIGHTHEADEDNESS: ICD-10-CM

## 2024-11-05 PROCEDURE — 3078F DIAST BP <80 MM HG: CPT | Mod: GC

## 2024-11-05 PROCEDURE — 99214 OFFICE O/P EST MOD 30 MIN: CPT | Mod: GC

## 2024-11-05 PROCEDURE — 3074F SYST BP LT 130 MM HG: CPT | Mod: GC

## 2024-11-05 RX ORDER — CARVEDILOL 3.12 MG/1
1 TABLET ORAL 2 TIMES DAILY WITH MEALS
COMMUNITY

## 2024-11-05 RX ORDER — TRAMADOL HYDROCHLORIDE 50 MG/1
50 TABLET ORAL EVERY 6 HOURS PRN
COMMUNITY
End: 2024-11-05

## 2024-11-05 RX ORDER — AMOXICILLIN 500 MG/1
CAPSULE ORAL
COMMUNITY
End: 2024-11-05

## 2024-11-05 RX ORDER — POLYETHYLENE GLYCOL 3350 17 G/17G
1 POWDER, FOR SOLUTION ORAL DAILY
COMMUNITY
End: 2024-11-05

## 2024-11-05 RX ORDER — ATORVASTATIN CALCIUM 40 MG/1
1 TABLET, FILM COATED ORAL
COMMUNITY

## 2024-11-05 RX ORDER — GABAPENTIN 300 MG/1
300 CAPSULE ORAL 3 TIMES DAILY
COMMUNITY
End: 2024-11-05

## 2024-11-05 RX ORDER — ATORVASTATIN CALCIUM 10 MG/1
1 TABLET, FILM COATED ORAL
COMMUNITY
End: 2024-11-05

## 2024-11-05 RX ORDER — LOSARTAN POTASSIUM 25 MG/1
1 TABLET ORAL
COMMUNITY
End: 2024-11-05

## 2024-11-05 ASSESSMENT — ENCOUNTER SYMPTOMS
COUGH: 0
SHORTNESS OF BREATH: 0
DIZZINESS: 0
BACK PAIN: 1
FOCAL WEAKNESS: 0
VOMITING: 0
MYALGIAS: 0
CONSTIPATION: 1
NECK PAIN: 0
WHEEZING: 0
SENSORY CHANGE: 0
ABDOMINAL PAIN: 1
PALPITATIONS: 0
WEAKNESS: 0
CHILLS: 0
DIARRHEA: 0
FEVER: 0
HEADACHES: 0
NAUSEA: 0
WEIGHT LOSS: 0

## 2024-11-05 ASSESSMENT — FIBROSIS 4 INDEX: FIB4 SCORE: 5.33

## 2024-11-05 NOTE — PROGRESS NOTES
"    Follow Up      Chief Complaint: Lightheadedness    Last Seen: 10/22/24    History of Present Illness:   Sharan Noe is a 79 y.o. male with PMH of essential hypertension, Hyperlipidemia, unspecified hyperlipidemia type, Chronic atrial fibrillation (HCC), Chronic constipation, and Chronic bilateral low back pain, unspecified whether sciatica present who presents for a follow up after a recent fall.     The patient reports experiencing lightheadedness occasionally when standing up, particularly after working out or when transitioning from laying to standing. This symptom has been present intermittently and was noted during nighttime bathroom trips. The patient has fallen twice, once after slipping from a toilet seat and then again in the shower. During these instances, the patient felt \"woozy\" but did not experience any spinning sensation typical of dizziness. The patient reports that staying hydrated might have helped slightly but still experiences lightheadedness. Review of systems is negative for spinning dizziness, significant shortness of breath, or palpitations. 4-5 glasses of water a day. Exercise related lightheadedness is chronic and different than the fall he had in the shower that caused the head bleeding he initially came in for. Denies chest pain, exertional or otherwise. States he has never had symptoms as a result of his afib despite being diagnosed more than 20 years ago.    Reports constipation and occasional stomach pain after meals, this is longstanding but patient does not eat much fiber generally speaking. Denies blood in stool, GERD, frequent NSAID use.       Review of Systems:  Review of Systems   Constitutional:  Negative for chills, fever, malaise/fatigue and weight loss.   Respiratory:  Negative for cough, shortness of breath and wheezing.    Cardiovascular:  Negative for chest pain, palpitations and leg swelling.   Gastrointestinal:  Positive for abdominal pain and constipation. " Negative for diarrhea, nausea and vomiting.   Genitourinary:  Negative for dysuria, frequency, hematuria and urgency.   Musculoskeletal:  Positive for back pain and joint pain. Negative for myalgias and neck pain.   Skin:  Negative for itching and rash.   Neurological:  Negative for dizziness, sensory change, focal weakness, weakness and headaches.        Past Medical History:   Past Medical History:   Diagnosis Date    A-fib (HCC) years    years    Amaurosis fugax 10/7/2021    Arthritis     hands    Benign non-nodular prostatic hyperplasia with lower urinary tract symptoms 6/22/2017    Calculus of gallbladder without cholecystitis without obstruction 6/22/2017    Carotid artery stenosis 8/26/2019    Carpal tunnel syndrome of right wrist 12/21/2015    Cerebrovascular disease 11/30/2021    Chronic atrial fibrillation (HCC) 6/22/2017    Coronary atherosclerosis due to calcified coronary lesions of native arteries, severe, asymptomatic 6/22/2017    Disease due to severe acute respiratory syndrome coronavirus 2 (SARS-CoV-2) 2/11/2021    Essential (primary) hypertension 8/26/2019    Family history of hypertension in mother     Family history of ischemic heart disease (IHD)     Family history of ischemic heart disease (IHD)     IMO load March 2020    Family history of pancreatic cancer     sister    Goiter     Thoracic aorta atherosclerosis (HCC) 6/22/2017    Vitamin D deficiency 6/22/2017       Patient Active Problem List    Diagnosis Date Noted    Right inguinal hernia 02/20/2024    Memory loss 09/05/2023    Adenomatous polyp of colon 09/01/2022    Chronic bilateral low back pain 09/01/2022    Chronic constipation 05/26/2022    Cerebrovascular disease 11/30/2021    Amaurosis fugax 10/07/2021    Nontoxic multinodular goiter 10/07/2021    Lower abdominal pain 10/07/2021    Hearing loss, bilateral 08/27/2019    Hyperlipidemia, unspecified 08/27/2019    Carotid artery stenosis 08/26/2019    Essential (primary) hypertension  08/26/2019    Chronic atrial fibrillation (HCC) 06/22/2017    Agatston CAC score, >400 06/22/2017    Elevated fasting glucose 06/22/2017    Vitamin D deficiency 06/22/2017    Coronary atherosclerosis due to calcified coronary lesions of native arteries, severe, asymptomatic 06/22/2017    Thoracic aorta atherosclerosis (HCC) 06/22/2017    Calculus of gallbladder without cholecystitis without obstruction 06/22/2017    Benign non-nodular prostatic hyperplasia with lower urinary tract symptoms 06/22/2017    Family history of pancreatic cancer     Carpal tunnel syndrome of right wrist 12/21/2015       Past Surgical History:   Past Surgical History:   Procedure Laterality Date    NH TRANSURETHRAL ELEC-SURG PROSTATECTOM  04/11/2019    Procedure: TURP (TRANSURETHRAL RESECTION OF PROSTATE);  Surgeon: Francisco Valladares M.D.;  Location: SURGERY [facility];  Service: Urology    CYSTOSCOPY  04/11/2019    Procedure: CYSTOSCOPY;  Surgeon: Francisco Valladares M.D.;  Location: SURGERY [facility];  Service: Urology    LUMBAR LAMINECTOMY DISKECTOMY  2014    Dr. Dillon    CARPAL TUNNEL RELEASE      CATARACT EXTRACTION WITH IOL Bilateral     OTHER ORTHOPEDIC SURGERY Right     knee arthroscopy, cartilage repair        Allergies:  Germanium    Medications:     Current Outpatient Medications:     atorvastatin, 1 Tablet, Oral, QDAY, Taking    carvedilol, 1 Tablet, Oral, BID WITH MEALS, Taking    losartan, 25 mg, Oral, DAILY, Taking    apixaban, 5 mg, Oral, BID, Taking    polyethylene glycol 3350, 1 Packet, Oral, DAILY    amoxicillin, TAKE 1 CAPSULE BY MOUTH 4 TIMES DAILY UNTIL GONE    gabapentin, 300 mg, Oral, TID    traMADol, 50 mg, Oral, Q6HRS PRN    atorvastatin, 1 Tablet, Oral, QDAY    losartan, 1 Tablet, Oral, QDAY    atorvastatin, 40 mg, Oral, DAILY    carvedilol, 3.125 mg, Oral, BID WITH MEALS     Social History:   Social History     Tobacco Use    Smoking status: Never    Smokeless tobacco: Never   Vaping Use    Vaping  "status: Never Used   Substance Use Topics    Alcohol use: Yes     Alcohol/week: 0.0 oz     Comment: rarely    Drug use: No       Family History:   Family History   Problem Relation Age of Onset    Heart Attack Father 89    Hypertension Mother     Dementia Mother 80    Cancer Sister 68        pancreatic    Other Sister         Brain aneurysm       Objective:  Vitals:   /79 (BP Location: Left arm, Patient Position: Sitting, BP Cuff Size: Adult)   Pulse 63   Temp 36.5 °C (97.7 °F) (Temporal)   Ht 1.85 m (6' 0.84\")   Wt 87.2 kg (192 lb 3.2 oz)   SpO2 95%  Body mass index is 25.47 kg/m².    Physical Exam:   Physical Exam  Constitutional:       Appearance: Normal appearance. He is normal weight.   HENT:      Head: Normocephalic and atraumatic.   Cardiovascular:      Rate and Rhythm: Normal rate and regular rhythm.      Pulses: Normal pulses.      Heart sounds: Normal heart sounds. No murmur heard.  Pulmonary:      Effort: Pulmonary effort is normal.      Breath sounds: No wheezing, rhonchi or rales.   Abdominal:      General: Abdomen is flat. There is no distension.      Palpations: Abdomen is soft. There is no mass.      Tenderness: There is no abdominal tenderness.   Musculoskeletal:      Right lower leg: No edema.      Left lower leg: No edema.   Skin:     General: Skin is warm and dry.      Findings: No lesion or rash.   Neurological:      General: No focal deficit present.      Mental Status: He is alert and oriented to person, place, and time. Mental status is at baseline.          Results:  Labs and imaging relevant to this visit were reviewed.     Assessment and Plan:    79 y.o. male with:     #Positional lightheadedness  Seems very positional in nature so likely orthostatic hypotension, was not positive last time but given how this seems entirely positional in nature and that he describes it as a lightheadedness with no palpitations, his cardiac history is quite stable and his Afib rate is quite low. " Previous echo and stress test 1 year ago was relatively normal. Unlikely arrhythmia because generally those would cause patient to pass out and it would not be gradual, unlikely valvular given normal echo a year ago and previous exam unremarkable. Does not have any neurological deficits, has some slowed gait but this is related to back pain and not a neurological issue and this is chronic not new. Could however contribute to the mechanical nature of his first fall off the toilet.  - Comp Metabolic Panel; Future  - Instructions to go to ER if any alarm symptoms or any future falls   - Discuss with cardiology   - Instructed patient to increase fluid intake to 6 - 12 oz glasses a day     #Essential (primary) hypertension  -continue Losartan     #Chronic atrial fibrillation (HCC)  -continue elaquis and carvedilol     #Dyslipidemia   -Continue Atorvastatin 40 mg     #Chronic bilateral low back pain, unspecified whether sciatica present  -continue following with pain specialist     #Renal insufficiency  Previous labs showed renal insufficiency likely 2/2 inadaquate hydration due to flu, however no labs since to monitor improvement   - Comp Metabolic Panel; Future      No problem-specific Assessment & Plan notes found for this encounter.       Orders Placed This Encounter    polyethylene glycol 3350 (MIRALAX) 17 GM/SCOOP Powder    amoxicillin (AMOXIL) 500 MG Cap    gabapentin (NEURONTIN) 300 MG Cap    traMADol (ULTRAM) 50 MG Tab    DISCONTD: apixaban (ELIQUIS) 5mg Tab    atorvastatin (LIPITOR) 10 MG Tab    atorvastatin (LIPITOR) 40 MG Tab    carvedilol (COREG) 3.125 MG Tab    losartan (COZAAR) 25 MG Tab     No follow-ups on file.    Sonny Contreras MD  Internal Medicine PGY-1  [facility]

## 2024-11-05 NOTE — PATIENT INSTRUCTIONS
-Please get a comprehensive metabolic panel done at the lab   -Please drink 6 glasses of water daily, if pee is dark, increase water intake

## 2024-12-04 ENCOUNTER — HOSPITAL ENCOUNTER (EMERGENCY)
Facility: MEDICAL CENTER | Age: 79
End: 2024-12-04
Attending: EMERGENCY MEDICINE
Payer: MEDICARE

## 2024-12-04 VITALS
HEART RATE: 71 BPM | WEIGHT: 192 LBS | TEMPERATURE: 96.9 F | SYSTOLIC BLOOD PRESSURE: 155 MMHG | BODY MASS INDEX: 25.45 KG/M2 | RESPIRATION RATE: 18 BRPM | DIASTOLIC BLOOD PRESSURE: 77 MMHG | OXYGEN SATURATION: 96 % | HEIGHT: 73 IN

## 2024-12-04 DIAGNOSIS — Z79.01 ANTICOAGULATED: ICD-10-CM

## 2024-12-04 DIAGNOSIS — R04.0 ACUTE ANTERIOR EPISTAXIS: ICD-10-CM

## 2024-12-04 PROCEDURE — 700102 HCHG RX REV CODE 250 W/ 637 OVERRIDE(OP): Performed by: EMERGENCY MEDICINE

## 2024-12-04 PROCEDURE — 700101 HCHG RX REV CODE 250: Performed by: EMERGENCY MEDICINE

## 2024-12-04 PROCEDURE — A9270 NON-COVERED ITEM OR SERVICE: HCPCS | Performed by: EMERGENCY MEDICINE

## 2024-12-04 PROCEDURE — 99284 EMERGENCY DEPT VISIT MOD MDM: CPT

## 2024-12-04 PROCEDURE — 303620 HCHG EPISTAXIS CONTROL

## 2024-12-04 RX ORDER — TRANEXAMIC ACID 100 MG/ML
3 INJECTION, SOLUTION INTRAVENOUS ONCE
Status: COMPLETED | OUTPATIENT
Start: 2024-12-04 | End: 2024-12-04

## 2024-12-04 RX ADMIN — TRANEXAMIC ACID 300 MG: 100 INJECTION, SOLUTION INTRAVENOUS at 23:30

## 2024-12-04 RX ADMIN — AMOXICILLIN AND CLAVULANATE POTASSIUM 1 TABLET: 875; 125 TABLET, FILM COATED ORAL at 23:13

## 2024-12-04 ASSESSMENT — PAIN DESCRIPTION - PAIN TYPE: TYPE: ACUTE PAIN

## 2024-12-04 ASSESSMENT — FIBROSIS 4 INDEX: FIB4 SCORE: 5.33

## 2024-12-05 NOTE — DISCHARGE INSTRUCTIONS
Take the antibiotic until which time the Rhino Rocket has been removed by the ear nose and throat doctor.  We have placed a referral in the computer for them to see you in the next couple to few days.  We also recommend you calling them to schedule an appointment for Rhino Rocket removal and epistaxis management.  Return to the emergency department as needed for any concern.

## 2024-12-05 NOTE — ED TRIAGE NOTES
"Chief Complaint   Patient presents with    Epistaxis     Pt arrives with complaint of nose bleed since 2000 without injury. Pt takes eliquis for A-fib. Pt states he was blowing nose when it started. Pt arrives with gauze in place. Pt aox4, skin warm and dry, airway patent, rr even and unlabored, speaking clear sentences, nose clamp placed.    BP (!) 179/97   Pulse 76   Temp 36.1 °C (96.9 °F) (Temporal)   Resp 16   Ht 1.854 m (6' 1\")   Wt 87.1 kg (192 lb)   SpO2 98%   BMI 25.33 kg/m²     Pt updated on rooming process and to notify staff for worsening condition. Pt sent back to lobby until a room is available.    "

## 2024-12-05 NOTE — ED PROVIDER NOTES
ER Provider Note    Scribed for  Chevy Sequeira D.O. by Dotty Moreno. 12/4/2024   10:33 PM    Primary Care Provider: Carlo Peters M.D.    CHIEF COMPLAINT  Chief Complaint   Patient presents with    Epistaxis     EXTERNAL RECORDS REVIEWED  Outpatient Notes Patient had a follow up office visit with [facility] for back pain and epistaxis      HPI/ROS  LIMITATION TO HISTORY   Select: : None  OUTSIDE HISTORIAN(S):  None    Sharan Noe is a 79 y.o. male with history of atrial fibrillation on Eliquis who presents to the ED for left nare epistaxis, onset tonight at 8:00 PM. Patient states that his epistaxis started after he blew his nose, he denies any trauma or injuries that may have caused it. Patient reports that he has had 1 episode of epistaxis before but that it did not require he be medically evaluated. Patient otherwise does not report any other symptoms and injuries at this time.     PAST MEDICAL HISTORY  Past Medical History:   Diagnosis Date    A-fib (HCC) years    years    Amaurosis fugax 10/7/2021    Arthritis     hands    Benign non-nodular prostatic hyperplasia with lower urinary tract symptoms 6/22/2017    Calculus of gallbladder without cholecystitis without obstruction 6/22/2017    Carotid artery stenosis 8/26/2019    Carpal tunnel syndrome of right wrist 12/21/2015    Cerebrovascular disease 11/30/2021    Chronic atrial fibrillation (HCC) 6/22/2017    Coronary atherosclerosis due to calcified coronary lesions of native arteries, severe, asymptomatic 6/22/2017    Disease due to severe acute respiratory syndrome coronavirus 2 (SARS-CoV-2) 2/11/2021    Essential (primary) hypertension 8/26/2019    Family history of hypertension in mother     Family history of ischemic heart disease (IHD)     Family history of ischemic heart disease (IHD)     IMO load March 2020    Family history of pancreatic cancer     sister    Goiter     Thoracic aorta atherosclerosis (HCC) 6/22/2017     "Vitamin D deficiency 6/22/2017       SURGICAL HISTORY  Past Surgical History:   Procedure Laterality Date    MA TRANSURETHRAL ELEC-SURG PROSTATECTOM  04/11/2019    Procedure: TURP (TRANSURETHRAL RESECTION OF PROSTATE);  Surgeon: Francisco Valladares M.D.;  Location: SURGERY [facility];  Service: Urology    CYSTOSCOPY  04/11/2019    Procedure: CYSTOSCOPY;  Surgeon: Francisco Valladares M.D.;  Location: SURGERY [facility];  Service: Urology    LUMBAR LAMINECTOMY DISKECTOMY  2014    Dr. Dillon    CARPAL TUNNEL RELEASE      CATARACT EXTRACTION WITH IOL Bilateral     OTHER ORTHOPEDIC SURGERY Right     knee arthroscopy, cartilage repair       FAMILY HISTORY  Family History   Problem Relation Age of Onset    Heart Attack Father 89    Hypertension Mother     Dementia Mother 80    Cancer Sister 68        pancreatic    Other Sister         Brain aneurysm       SOCIAL HISTORY   reports that he has never smoked. He has never used smokeless tobacco. He reports that he does not currently use alcohol. He reports that he does not use drugs.    CURRENT MEDICATIONS  Discharge Medication List as of 12/4/2024 11:24 PM        CONTINUE these medications which have NOT CHANGED    Details   atorvastatin (LIPITOR) 40 MG Tab Take 1 Tablet by mouth every day., Historical Med      carvedilol (COREG) 3.125 MG Tab Take 1 Tablet by mouth 2 times a day with meals., Historical Med      losartan (COZAAR) 25 MG Tab TAKE 1 TABLET BY MOUTH EVERY DAY, Disp-90 Tablet, R-3, Normal      apixaban (ELIQUIS) 5mg Tab Take 5 mg by mouth 2 Times a Day., Historical Med             ALLERGIES  Allergies   Allergen Reactions    Germanium      Vomiting        PHYSICAL EXAM  BP (!) 179/97   Pulse 76   Temp 36.1 °C (96.9 °F) (Temporal)   Resp 16   Ht 1.854 m (6' 1\")   Wt 87.1 kg (192 lb)   SpO2 98%   BMI 25.33 kg/m²    General: No acute distress, elderly in kind.  Here with his wife.  Neuro: Awake alert and oriented, muscle strength sensation " normal  Nose: Bleeding from left nare, no septal hematoma.  Neck: Supple  Cardiac: Regular rate and rhythm  Pulmonary: Clear to auscultation bilaterally no distress  Abdomen: Soft nontender nondistended  Back: Nontender  Psych: Normal  Skin: Pink warm dry  Extremities: Full range of motion, muscle strength sensation intact 2+ pulses    DIAGNOSTIC STUDIES/PROCEDURES  Labs: Labs Reviewed - No data to display  I have independently interpreted the above labs    EKG:   Results for orders placed or performed in visit on 19   EKG   Result Value Ref Range    Report       [facility]    Test Date:  2019  Pt Name:    BLANCA TAYLOR              Department: [facility]  MRN:        [id number]                      Room:  Gender:     Male                         Technician: [facility]  :        1945                   Requested By:TOPHER ROB  Order #:    [id number]                    Reading MD: Mike Ortega MD    Measurements  Intervals                                Axis  Rate:       83                           P:  MT:                                      QRS:        -62  QRSD:       106                          T:          45  QT:         416  QTc:        489    Interpretive Statements  ATRIAL FIBRILLATION, V-RATE    VENTRICULAR PREMATURE COMPLEX  LEFT ANTERIOR FASCICULAR BLOCK  CONSIDER ANTEROSEPTAL INFARCT  BORDERLINE T ABNORMALITIES, LATERAL LEADS  BORDERLINE PROLONGED QT INTERVAL  No previous ECG available for comparison    Electronically Signed On 3- 0:06:10 PDT by Mike Ortega MD       I have independently interpreted this EKG    Radiology:   This attending emergency physician has independently interpreted the diagnostic imaging associated with this visit and is awaiting the final reading from the radiologist.   Preliminary interpretation is a follows: None  Radiologist interpretation:   No orders to display      Epistaxis Procedure    Indication: Bleeding    Procedure:  The patient was positioned appropriately and a Rhinorocket covered in Tranexamic acid was placed in the left nare. Hemostasis was obtained.    The patient tolerated the procedure well.    Complications: None      COURSE & MEDICAL DECISION MAKING     INITIAL ASSESSMENT, COURSE AND PLAN  Differential diagnoses include but not limited to: Anterior nose bleed, posterior nose bleed     Care Narrative: Patient on Eliquis with nosebleed.  On exam it is an anterior left-sided nosebleed.    10:32 PM - Patient was first seen and evaluated at bedside. Patient presents to the ED for left nare epistaxis. Patient verbalizes understanding and support with my plan of care.     11:01 PM - I have ordered for Augmentin 875-125 MG PO    11:05 PM - Patient was reevaluated at bedside. I have ordered for tranexamic acid 1000 MG/10 ML for nasal packing 300 MG for epistaxis procedure.    11:12 PM - Epistaxis procedure has been completed. Refer to procedure note above.        ED COURSE AND ADDITIONAL PROBLEMS    Patient with left-sided anterior nosebleed on Eliquis.  Hemodynamically stable.  I gave the patient the option of just applying pressure versus Rhino Rocket.  Patient and wife elected Rhino Rocket understanding the need for ENT follow-up.  A Rhino Rocket after covering it and tranexamic acid was used to achieve hemostasis.  And 5 cc of air was inflated.  Patient was started on Augmentin to be continued until Rhino Rocket has been removed by ENT.  Referral for ENT has been placed.  I offered the patient Percocet for pain and he declined.  No complications and hemostasis achieved.  It was a very kind couple    DISPOSITION AND DISCUSSIONS    Escalation of care considered, and ultimately not performed: diagnostic imaging.    Barriers to care at this time, including but not limited to:     Decision tools and prescription drugs considered including, but not limited to:  Augmentin .    FINAL DIAGNOSIS  1. Acute anterior epistaxis    2.  Anticoagulated         Dotty FLORES (Scribe), am scribing for, and in the presence of, Chevy Sequeira D.O..    Electronically signed by: Dotty Moreno (Maheshibjoana), 12/4/2024    Chevy FLORES D.O. personally performed the services described in this documentation, as scribed by Dotty Moreno in my presence, and it is both accurate and complete.      The note accurately reflects work and decisions made by me.  Chevy Sequeira D.O.  12/5/2024  1:11 AM

## 2025-02-06 DIAGNOSIS — I10 ESSENTIAL (PRIMARY) HYPERTENSION: ICD-10-CM

## 2025-02-06 RX ORDER — LOSARTAN POTASSIUM 25 MG/1
25 TABLET ORAL DAILY
Qty: 90 TABLET | Refills: 3 | Status: SHIPPED | OUTPATIENT
Start: 2025-02-06

## 2025-04-01 ENCOUNTER — OFFICE VISIT (OUTPATIENT)
Dept: INTERNAL MEDICINE | Facility: OTHER | Age: 80
End: 2025-04-01
Payer: MEDICARE

## 2025-04-01 VITALS
BODY MASS INDEX: 25.15 KG/M2 | HEIGHT: 73 IN | HEART RATE: 61 BPM | SYSTOLIC BLOOD PRESSURE: 125 MMHG | DIASTOLIC BLOOD PRESSURE: 76 MMHG | TEMPERATURE: 97.6 F | OXYGEN SATURATION: 99 % | WEIGHT: 189.8 LBS

## 2025-04-01 DIAGNOSIS — M54.50 CHRONIC BILATERAL LOW BACK PAIN, UNSPECIFIED WHETHER SCIATICA PRESENT: ICD-10-CM

## 2025-04-01 DIAGNOSIS — I48.0 PAROXYSMAL ATRIAL FIBRILLATION (HCC): ICD-10-CM

## 2025-04-01 DIAGNOSIS — G89.29 CHRONIC BILATERAL LOW BACK PAIN, UNSPECIFIED WHETHER SCIATICA PRESENT: ICD-10-CM

## 2025-04-01 DIAGNOSIS — K59.09 CHRONIC CONSTIPATION: ICD-10-CM

## 2025-04-01 DIAGNOSIS — Z11.59 NEED FOR HEPATITIS C SCREENING TEST: ICD-10-CM

## 2025-04-01 DIAGNOSIS — I10 ESSENTIAL (PRIMARY) HYPERTENSION: ICD-10-CM

## 2025-04-01 DIAGNOSIS — E78.5 HYPERLIPIDEMIA, UNSPECIFIED HYPERLIPIDEMIA TYPE: ICD-10-CM

## 2025-04-01 PROCEDURE — 3074F SYST BP LT 130 MM HG: CPT | Performed by: INTERNAL MEDICINE

## 2025-04-01 PROCEDURE — 3078F DIAST BP <80 MM HG: CPT | Performed by: INTERNAL MEDICINE

## 2025-04-01 PROCEDURE — 99214 OFFICE O/P EST MOD 30 MIN: CPT | Performed by: INTERNAL MEDICINE

## 2025-04-01 ASSESSMENT — FIBROSIS 4 INDEX: FIB4 SCORE: 5.4

## 2025-04-01 NOTE — PATIENT INSTRUCTIONS
For Blood Pressure:  Check your blood pressure at home 3-4 times per week.  Record those readings and bring them with you to your next appointment.    When you check it, do it seated with your feet on the floor, your back supported and your arm resting at heart level (such as on the kitchen table). Check it three times,  by approximately 1 minute between each reading.  Take the average of the three readings and record that as your blood pressure for the day.

## 2025-04-02 NOTE — PROGRESS NOTES
Established Patient    Patient Care Team:  Carlo Peters M.D. as PCP - General    Sharan Noe is a 80 y.o. male who presents today with the following Chief Complaint(s):  Chief Complaint   Patient presents with    Follow-Up     6 month follow up    and for follow up for Diagnoses of Essential (primary) hypertension, Hyperlipidemia, unspecified hyperlipidemia type, Chronic atrial fibrillation (HCC), Chronic bilateral low back pain, unspecified whether sciatica present, Chronic constipation, and Need for hepatitis C screening test were pertinent to this visit.    ROS:     Denies any new chest pain or shortness of breath.  No changes to urinary or bowel function.  Chronic back pain as per HPI see HPI.    Past Medical History:   Diagnosis Date    A-fib (HCC) years    years    Amaurosis fugax 10/7/2021    Arthritis     hands    Benign non-nodular prostatic hyperplasia with lower urinary tract symptoms 6/22/2017    Calculus of gallbladder without cholecystitis without obstruction 6/22/2017    Carotid artery stenosis 8/26/2019    Carpal tunnel syndrome of right wrist 12/21/2015    Cerebrovascular disease 11/30/2021    Chronic atrial fibrillation (HCC) 6/22/2017    Coronary atherosclerosis due to calcified coronary lesions of native arteries, severe, asymptomatic 6/22/2017    Disease due to severe acute respiratory syndrome coronavirus 2 (SARS-CoV-2) 2/11/2021    Essential (primary) hypertension 8/26/2019    Family history of hypertension in mother     Family history of ischemic heart disease (IHD)     Family history of ischemic heart disease (IHD)     IMO load March 2020    Family history of pancreatic cancer     sister    Goiter     Thoracic aorta atherosclerosis (HCC) 6/22/2017    Vitamin D deficiency 6/22/2017     Social History     Tobacco Use    Smoking status: Never    Smokeless tobacco: Never   Vaping Use    Vaping status: Never Used   Substance Use Topics    Alcohol use: Yes     Alcohol/week: 0.6  "oz     Types: 1 Cans of beer per week     Comment: rarely    Drug use: No     Current Outpatient Medications   Medication Sig Dispense Refill    Ibuprofen (ADVIL PO) Take  by mouth.      losartan (COZAAR) 25 MG Tab TAKE 1 TABLET BY MOUTH EVERY DAY 90 Tablet 3    atorvastatin (LIPITOR) 40 MG Tab Take 1 Tablet by mouth every day.      carvedilol (COREG) 3.125 MG Tab Take 1 Tablet by mouth 2 times a day with meals.      apixaban (ELIQUIS) 5mg Tab Take 5 mg by mouth 2 Times a Day.       No current facility-administered medications for this visit.       Physical Exam:  /76 (BP Location: Right arm, Patient Position: Sitting, BP Cuff Size: Adult)   Pulse 61   Temp 36.4 °C (97.6 °F) (Temporal)   Ht 1.85 m (6' 0.84\")   Wt 86.1 kg (189 lb 12.8 oz)   SpO2 99%   BMI 25.15 kg/m²   General: Well developed, well nourished male, in no distress.  Eyes: Conjuntiva without any obvious injection or erythema.   Cardiovascular: Heart is regular with no murmur  Lungs: Clear to auscultation bilaterally. No wheezes, rhonci or crackles heard. Respiratory effort is normal.  Abd: Soft, non-tender  Ext: No edema    HPI / Assessment / Plan:  1. Essential (primary) hypertension  Blood pressure is controlled in the office today.  Sharan did not bring blood pressure logs to the office today. Home Blood Pressure readings are not consistently recorded by the patient.  Sharan reports good adherence to medication regimen with no changes.  No intolerable side effects noted, he does however note some dizziness upon standing, especially when he gets out of bed.  It usually last just a few seconds and then resolves spontaneously.  No associated palpitations.  Plan:  For now we will keep him on the current regimen however I do have some concerns that his blood pressure at home may be lower than here in the office.  I asked him to do some home blood pressure monitoring more consistently to assess this.  It may be that he needs a little bit more blood " pressure support to prevent his postural hypotension when getting out of bed.  Additionally, encouraged increased hydration as this may be contributing to the dizziness.    2. Hyperlipidemia, unspecified hyperlipidemia type  Sharan is tolerating medication well.  No intolerable side-effects noted.  No new symptoms of muscle aches or weakness.  Patient reports good adherence to medication regimen.  No change in medication since the last visit. Sharan is trying to follow a low-cholesterol diet.  Exercise is adequate.   Plan:  Currently this is stable and well controlled.  The patient should continue current therapy with no changes at this time.        3. Atrial fibrillation (HCC)  Generally doing well.  No recent episodes of atrial fibrillation that he notes.  He is tolerating Eliquis 5 mg twice daily well without any significant bleeding.  Plan:  Overall doing well on the current regimen.  No changes needed today.      4. Chronic bilateral low back pain, unspecified whether sciatica present  This issue is currently the major factor contributing to his overall health right now.  He finds it to be quite debilitating and significantly impactful on his life.  He has chronic back pain that worsens with even minimal activity.  He finds this impacts his ability to walk any significant distance.  [place].  He sees pain management, and has had an electronic stimulator placed but does not find any significant benefit from this.  He is also in the past been evaluated for the possibility of nerve ablation, but was previously deemed not a candidate because it was thought it would likely be ineffective based on the initial testing.  Plan:  He is getting some modest relief from Tylenol and ibuprofen, but does try to avoid taking too much of this, and likely this is both inadequate in terms of pain control, and not ideal as a long-term solution, especially the  ibuprofen given that he is also on apixaban.  I encouraged him to continue to follow-up with his pain management team for any additional therapy they may offer.  He notes that in the past there has been discussion of lumbar fusion.    5. Chronic constipation  Generally doing better.  He seems to have a good working knowledge of the things he can do to maintain some bowel regularity, and he is for the most part good about sticking to that regimen.    6. Need for hepatitis C screening test  It was noticed upon review of his health maintenance status that he is in need of routine screening for hepatitis C, so this order has been placed for him today.  - HEP C VIRUS ANTIBODY; Future     Orders Placed This Encounter    HEP C VIRUS ANTIBODY    Ibuprofen (ADVIL PO)       Return in about 6 months (around 10/1/2025).    Carlo Peters M.D.  Professor of Medicine  [employer]    This note was created using voice recognition software.  While every attempt is made to ensure accuracy of transcription, occasionally errors occur.

## 2025-07-18 ENCOUNTER — TELEPHONE (OUTPATIENT)
Dept: INTERNAL MEDICINE | Facility: OTHER | Age: 80
End: 2025-07-18
Payer: MEDICARE

## 2025-07-18 NOTE — TELEPHONE ENCOUNTER
Scheduled for back surgery on 09/09/25. The surgeon suggested patient see Dr. Peters before his surgery but there are no openings available.

## 2025-07-21 NOTE — TELEPHONE ENCOUNTER
Spoke to patient, patient stated his surgeon Dr. NAN Dillon with [facility] wanted a clearance for surgery fusion in back. His surgery is scheduled for 9/7/2025.    Would you like patient scheduled to discuss? Patient is ok coming in if it is needed, did state he was seen not too long ago by Dr. Peters.

## 2025-07-24 NOTE — TELEPHONE ENCOUNTER
Spoke to patient. Patient is aware we received the surgery clearance and it has been sent to [facility] neurosurgery. He has an appointment on Monday with cardiology to discuss his clearance for surgery.

## 2025-12-23 ENCOUNTER — APPOINTMENT (OUTPATIENT)
Dept: INTERNAL MEDICINE | Facility: OTHER | Age: 80
End: 2025-12-23
Payer: MEDICARE

## (undated) DEVICE — KIT ANESTHESIA W/CIRCUIT & 3/LT BAG W/FILTER (20EA/CA)

## (undated) DEVICE — MEDICINE CUP STERILE 2 OZ - (100/CA)

## (undated) DEVICE — HEAD HOLDER JUNIOR/ADULT

## (undated) DEVICE — PACK CYSTOSCOPY III - (8/CA)

## (undated) DEVICE — NEPTUNE 4 PORT MANIFOLD - (20/PK)

## (undated) DEVICE — GOWN SURGICAL X-LARGE ULTRA - FILM-REINFORCED (20/CA)

## (undated) DEVICE — CATHETER FOLEY 22 FR 30 CC (12EA/CA)

## (undated) DEVICE — GLYCINE IRRIGATION 1.5% - 3000 ML  (4/CS)

## (undated) DEVICE — BAG DECANTER (50EA/CS)

## (undated) DEVICE — SET LEADWIRE 5 LEAD BEDSIDE DISPOSABLE ECG (1SET OF 5/EA)

## (undated) DEVICE — ELECTRODE MONOPOLAR ANGLED CUTTING LOOP DIAM 0.35 YELLOW 24FR (6EA/PK)

## (undated) DEVICE — JELLY, KY 2 0Z STERILE

## (undated) DEVICE — GOWN SURGEONS X-LARGE - DISP. (30/CA)

## (undated) DEVICE — SENSOR SPO2 NEO LNCS ADHESIVE (20/BX) SEE USER NOTES

## (undated) DEVICE — COVER FOOT UNIVERSAL DISP. - (25EA/CA)

## (undated) DEVICE — KIT ROOM DECONTAMINATION

## (undated) DEVICE — GOWN WARMING STANDARD FLEX - (30/CA)

## (undated) DEVICE — SPONGE GAUZESTER 4 X 4 4PLY - (128PK/CA)

## (undated) DEVICE — MASK AIRWAY FLEXIBLE SINGLE-USE SIZE 5 ADULTS (10EA/BX)

## (undated) DEVICE — SET EXTENSION WITH 2 PORTS (48EA/CA) ***PART #2C8610 IS A SUBSTITUTE*****

## (undated) DEVICE — LACTATED RINGERS INJ 1000 ML - (14EA/CA 60CA/PF)

## (undated) DEVICE — GLOVE BIOGEL PI INDICATOR SZ 6.5 SURGICAL PF LF - (50/BX 4BX/CA)

## (undated) DEVICE — GOWN SURGEONS LARGE - (32/CA)

## (undated) DEVICE — SLEEVE, VASO, THIGH, MED

## (undated) DEVICE — BAG DRAINAGE ANTIREFLUX TOWER SLIDE TAP 4000 ML (20EA/CA)

## (undated) DEVICE — GLOVE BIOGEL SZ 7.5 SURGICAL PF LTX - (50PR/BX 4BX/CA)

## (undated) DEVICE — TUBING CLEARLINK DUO-VENT - C-FLO (48EA/CA)

## (undated) DEVICE — SET IRRIGATION CYSTOSCOPY Y-TYPE L81 IN (20EA/CA)

## (undated) DEVICE — PACK SINGLE BASIN - (6/CA)

## (undated) DEVICE — ELECTRODE 850 FOAM ADHESIVE - HYDROGEL RADIOTRNSPRNT (50/PK)

## (undated) DEVICE — CONTAINER SPECIMEN BAG OR - STERILE 4 OZ W/LID (100EA/CA)

## (undated) DEVICE — WATER IRRIG. STER. 1500 ML - (9/CA)

## (undated) DEVICE — EVACUATOR BLADDER ELLIK - (10/BX)

## (undated) DEVICE — TUBE CONNECT SUCTION CLEAR 120 X 1/4" (50EA/CA)"

## (undated) DEVICE — SUCTION INSTRUMENT YANKAUER BULBOUS TIP W/O VENT (50EA/CA)

## (undated) DEVICE — BAG URODRAIN WITH TUBING - (20/CA)

## (undated) DEVICE — SODIUM CHL. IRRIGATION 0.9% 3000ML (4EA/CA 65CA/PF)

## (undated) DEVICE — CONNECTOR HOSE NEPTUNE FOR CYSTO ROOM

## (undated) DEVICE — MASK ANESTHESIA ADULT  - (100/CA)

## (undated) DEVICE — PROTECTOR ULNA NERVE - (36PR/CA)